# Patient Record
Sex: MALE | Race: BLACK OR AFRICAN AMERICAN | NOT HISPANIC OR LATINO | Employment: OTHER | RURAL
[De-identification: names, ages, dates, MRNs, and addresses within clinical notes are randomized per-mention and may not be internally consistent; named-entity substitution may affect disease eponyms.]

---

## 2018-02-13 ENCOUNTER — HISTORICAL (OUTPATIENT)
Dept: ADMINISTRATIVE | Facility: HOSPITAL | Age: 63
End: 2018-02-13

## 2018-02-19 LAB
LAB AP CLINICAL INFORMATION: NORMAL
LAB AP DIAGNOSIS - HISTORICAL: NORMAL
LAB AP GROSS PATHOLOGY - HISTORICAL: NORMAL
LAB AP SPECIMEN SUBMITTED - HISTORICAL: NORMAL

## 2018-03-01 ENCOUNTER — HISTORICAL (OUTPATIENT)
Dept: ADMINISTRATIVE | Facility: HOSPITAL | Age: 63
End: 2018-03-01

## 2018-03-07 ENCOUNTER — HISTORICAL (OUTPATIENT)
Dept: ADMINISTRATIVE | Facility: HOSPITAL | Age: 63
End: 2018-03-07

## 2020-04-13 ENCOUNTER — HISTORICAL (OUTPATIENT)
Dept: ADMINISTRATIVE | Facility: HOSPITAL | Age: 65
End: 2020-04-13

## 2020-04-13 LAB
ALBUMIN SERPL BCP-MCNC: 3.4 G/DL (ref 3.5–5)
ALBUMIN/GLOB SERPL: 0.7 {RATIO}
ALP SERPL-CCNC: 88 U/L (ref 45–115)
ALT SERPL W P-5'-P-CCNC: 18 U/L (ref 16–61)
APTT PPP: 31.5 SECONDS (ref 25.2–37.3)
AST SERPL W P-5'-P-CCNC: 19 U/L (ref 15–37)
BACTERIA #/AREA URNS HPF: ABNORMAL /HPF
BASOPHILS # BLD AUTO: 0.01 X10E3/UL (ref 0–0.2)
BASOPHILS NFR BLD AUTO: 0.2 % (ref 0–1)
BILIRUB SERPL-MCNC: 0.4 MG/DL (ref 0–1.2)
BILIRUB UR QL STRIP: NEGATIVE MG/DL
BUN SERPL-MCNC: 20 MG/DL (ref 7–18)
BUN/CREAT SERPL: 16.1
CALCIUM SERPL-MCNC: 9 MG/DL (ref 8.5–10.1)
CHLORIDE SERPL-SCNC: 104 MMOL/L (ref 98–107)
CK MB SERPL-MCNC: <1 NG/ML (ref 1–3.6)
CK SERPL-CCNC: 150 U/L (ref 39–308)
CLARITY UR: CLEAR
CLARITY UR: CLEAR
CO2 SERPL-SCNC: 29 MMOL/L (ref 21–32)
COLOR UR: ABNORMAL
COLOR UR: ABNORMAL
CREAT SERPL-MCNC: 1.24 MG/DL (ref 0.7–1.3)
EOSINOPHIL # BLD AUTO: 0.04 X10E3/UL (ref 0–0.5)
EOSINOPHIL NFR BLD AUTO: 0.7 % (ref 1–4)
ERYTHROCYTE [DISTWIDTH] IN BLOOD BY AUTOMATED COUNT: 12.2 % (ref 11.5–14.5)
GLOBULIN SER-MCNC: 4.7 G/DL (ref 2–4)
GLUCOSE SERPL-MCNC: 191 MG/DL (ref 74–106)
GLUCOSE UR STRIP-MCNC: 100 MG/DL
HCT VFR BLD AUTO: 39.5 % (ref 40–54)
HGB BLD-MCNC: 12.5 G/DL (ref 13.5–18)
IMM GRANULOCYTES # BLD AUTO: 0.02 X10E3/UL (ref 0–0.04)
IMM GRANULOCYTES NFR BLD: 0.4 % (ref 0–0.4)
INR BLD: 0.9 (ref 0–3.3)
KETONES UR STRIP-SCNC: ABNORMAL MG/DL
LEUKOCYTE ESTERASE UR QL STRIP: NEGATIVE LEU/UL
LYMPHOCYTES # BLD AUTO: 1.29 X10E3/UL (ref 1–4.8)
LYMPHOCYTES NFR BLD AUTO: 22.6 % (ref 27–41)
MAGNESIUM SERPL-MCNC: 2.1 MG/DL (ref 1.7–2.3)
MCH RBC QN AUTO: 28.5 PG (ref 27–31)
MCHC RBC AUTO-ENTMCNC: 31.6 G/DL (ref 32–36)
MCV RBC AUTO: 90.2 FL (ref 80–96)
MONOCYTES # BLD AUTO: 0.38 X10E3/UL (ref 0–0.8)
MONOCYTES NFR BLD AUTO: 6.7 % (ref 2–6)
MPC BLD CALC-MCNC: 11.2 FL (ref 9.4–12.4)
MYOGLOBIN SERPL-MCNC: 45 NG/ML (ref 16–116)
NEUTROPHILS # BLD AUTO: 3.97 X10E3/UL (ref 1.8–7.7)
NEUTROPHILS NFR BLD AUTO: 69.4 % (ref 53–65)
NITRITE UR QL STRIP: NEGATIVE
NRBC # BLD AUTO: 0 X10E3/UL (ref 0–0)
NRBC, AUTO (.00): 0 /100 (ref 0–0)
PH UR STRIP: 7 PH UNITS (ref 5–8)
PLATELET # BLD AUTO: 169 X10E3/UL (ref 150–400)
POTASSIUM SERPL-SCNC: 4 MMOL/L (ref 3.5–5.1)
PROT SERPL-MCNC: 8.1 G/DL (ref 6.4–8.2)
PROT UR QL STRIP: 100 MG/DL
PROTHROMBIN TIME: 12.3 SECONDS (ref 11.7–14.7)
RBC # BLD AUTO: 4.38 X10E6/UL (ref 4.6–6.2)
RBC # UR STRIP: ABNORMAL ERY/UL
RBC #/AREA URNS HPF: ABNORMAL /HPF (ref 0–3)
SODIUM SERPL-SCNC: 139 MMOL/L (ref 136–145)
SP GR UR STRIP: 1.02 (ref 1–1.03)
SQUAMOUS #/AREA URNS LPF: ABNORMAL /LPF
TROPONIN I SERPL-MCNC: <0.017 NG/ML (ref 0–0.06)
UROBILINOGEN UR STRIP-ACNC: 0.2 EU/DL
WBC # BLD AUTO: 5.71 X10E3/UL (ref 4.5–11)
WBC #/AREA URNS HPF: ABNORMAL /HPF (ref 0–5)

## 2020-04-14 ENCOUNTER — HISTORICAL (OUTPATIENT)
Dept: ADMINISTRATIVE | Facility: HOSPITAL | Age: 65
End: 2020-04-14

## 2020-04-14 LAB
BUN SERPL-MCNC: 16 MG/DL (ref 7–18)
CALCIUM SERPL-MCNC: 9 MG/DL (ref 8.5–10.1)
CHLORIDE SERPL-SCNC: 105 MMOL/L (ref 98–107)
CHOLEST SERPL-MCNC: 247 MG/DL
CHOLEST/HDLC SERPL: 5.4 {RATIO}
CO2 SERPL-SCNC: 25 MMOL/L (ref 21–32)
CREAT SERPL-MCNC: 1.04 MG/DL (ref 0.7–1.3)
CRP SERPL-MCNC: 0.3 UG/ML (ref 0–0.8)
ERYTHROCYTE [SEDIMENTATION RATE] IN BLOOD BY WESTERGREN METHOD: 60 MM/HR (ref 0–20)
EST. AVERAGE GLUCOSE BLD GHB EST-MCNC: 257 MG/DL
GLUCOSE SERPL-MCNC: 132 MG/DL (ref 70–105)
GLUCOSE SERPL-MCNC: 138 MG/DL (ref 74–106)
GLUCOSE SERPL-MCNC: 187 MG/DL (ref 70–105)
GLUCOSE SERPL-MCNC: 193 MG/DL (ref 70–105)
GLUCOSE SERPL-MCNC: 205 MG/DL (ref 70–105)
GLUCOSE SERPL-MCNC: 249 MG/DL (ref 70–105)
HBA1C MFR BLD HPLC: 10.3 %
HDLC SERPL-MCNC: 46 MG/DL
LDLC SERPL CALC-MCNC: 183 MG/DL
POTASSIUM SERPL-SCNC: 3.7 MMOL/L (ref 3.5–5.1)
SODIUM SERPL-SCNC: 139 MMOL/L (ref 136–145)
T4 SERPL-MCNC: 8.9 UG/DL (ref 4.5–12.1)
TRIGL SERPL-MCNC: 91 MG/DL
TSH SERPL DL<=0.005 MIU/L-ACNC: 3.12 UIU/ML (ref 0.36–3.74)

## 2020-04-15 ENCOUNTER — HISTORICAL (OUTPATIENT)
Dept: ADMINISTRATIVE | Facility: HOSPITAL | Age: 65
End: 2020-04-15

## 2020-04-15 LAB
BASOPHILS # BLD AUTO: 0.01 X10E3/UL (ref 0–0.2)
BASOPHILS NFR BLD AUTO: 0.2 % (ref 0–1)
BUN SERPL-MCNC: 17 MG/DL (ref 7–18)
CALCIUM SERPL-MCNC: 9.2 MG/DL (ref 8.5–10.1)
CHLORIDE SERPL-SCNC: 103 MMOL/L (ref 98–107)
CO2 SERPL-SCNC: 29 MMOL/L (ref 21–32)
CREAT SERPL-MCNC: 1.19 MG/DL (ref 0.7–1.3)
EOSINOPHIL # BLD AUTO: 0.03 X10E3/UL (ref 0–0.5)
EOSINOPHIL NFR BLD AUTO: 0.5 % (ref 1–4)
ERYTHROCYTE [DISTWIDTH] IN BLOOD BY AUTOMATED COUNT: 12.2 % (ref 11.5–14.5)
GLUCOSE SERPL-MCNC: 141 MG/DL (ref 70–105)
GLUCOSE SERPL-MCNC: 144 MG/DL (ref 70–105)
GLUCOSE SERPL-MCNC: 154 MG/DL (ref 74–106)
GLUCOSE SERPL-MCNC: 162 MG/DL (ref 70–105)
GLUCOSE SERPL-MCNC: 257 MG/DL (ref 70–105)
HCT VFR BLD AUTO: 42.1 % (ref 40–54)
HGB BLD-MCNC: 13.8 G/DL (ref 13.5–18)
IMM GRANULOCYTES # BLD AUTO: 0.02 X10E3/UL (ref 0–0.04)
IMM GRANULOCYTES NFR BLD: 0.3 % (ref 0–0.4)
LYMPHOCYTES # BLD AUTO: 1.25 X10E3/UL (ref 1–4.8)
LYMPHOCYTES NFR BLD AUTO: 18.8 % (ref 27–41)
MAGNESIUM SERPL-MCNC: 1.9 MG/DL (ref 1.7–2.3)
MCH RBC QN AUTO: 29.3 PG (ref 27–31)
MCHC RBC AUTO-ENTMCNC: 32.8 G/DL (ref 32–36)
MCV RBC AUTO: 89.4 FL (ref 80–96)
MONOCYTES # BLD AUTO: 0.48 X10E3/UL (ref 0–0.8)
MONOCYTES NFR BLD AUTO: 7.2 % (ref 2–6)
MPC BLD CALC-MCNC: 10.5 FL (ref 9.4–12.4)
NEUTROPHILS # BLD AUTO: 4.86 X10E3/UL (ref 1.8–7.7)
NEUTROPHILS NFR BLD AUTO: 73 % (ref 53–65)
NRBC # BLD AUTO: 0 X10E3/UL (ref 0–0)
NRBC, AUTO (.00): 0 /100 (ref 0–0)
PLATELET # BLD AUTO: 174 X10E3/UL (ref 150–400)
POTASSIUM SERPL-SCNC: 4 MMOL/L (ref 3.5–5.1)
RBC # BLD AUTO: 4.71 X10E6/UL (ref 4.6–6.2)
SODIUM SERPL-SCNC: 139 MMOL/L (ref 136–145)
WBC # BLD AUTO: 6.65 X10E3/UL (ref 4.5–11)

## 2020-04-16 ENCOUNTER — HISTORICAL (OUTPATIENT)
Dept: ADMINISTRATIVE | Facility: HOSPITAL | Age: 65
End: 2020-04-16

## 2020-04-16 LAB
GLUCOSE SERPL-MCNC: 132 MG/DL (ref 70–105)
GLUCOSE SERPL-MCNC: 230 MG/DL (ref 70–105)

## 2020-06-09 ENCOUNTER — HISTORICAL (OUTPATIENT)
Dept: ADMINISTRATIVE | Facility: HOSPITAL | Age: 65
End: 2020-06-09

## 2020-06-09 LAB
APTT PPP: 38.4 SECONDS (ref 25.2–37.3)
BACTERIA #/AREA URNS HPF: ABNORMAL /HPF
BASOPHILS # BLD AUTO: 0.02 X10E3/UL (ref 0–0.2)
BASOPHILS NFR BLD AUTO: 0.2 % (ref 0–1)
BILIRUB UR QL STRIP: ABNORMAL MG/DL
BUN SERPL-MCNC: 84 MG/DL (ref 7–18)
CALCIUM SERPL-MCNC: 8.5 MG/DL (ref 8.5–10.1)
CHLORIDE SERPL-SCNC: 109 MMOL/L (ref 98–107)
CK MB SERPL-MCNC: 1.4 NG/ML (ref 1–3.6)
CK SERPL-CCNC: 1671 U/L (ref 39–308)
CLARITY UR: ABNORMAL
CLARITY UR: ABNORMAL
CO2 SERPL-SCNC: 26 MMOL/L (ref 21–32)
COLOR UR: YELLOW
COLOR UR: YELLOW
CREAT SERPL-MCNC: 2.76 MG/DL (ref 0.7–1.3)
CRYSTALS FLD MICRO: ABNORMAL
DOHLE BOD BLD QL SMEAR: ABNORMAL
EOSINOPHIL # BLD AUTO: 0 X10E3/UL (ref 0–0.5)
EOSINOPHIL NFR BLD AUTO: 0 % (ref 1–4)
ERYTHROCYTE [DISTWIDTH] IN BLOOD BY AUTOMATED COUNT: 12.9 % (ref 11.5–14.5)
GLUCOSE SERPL-MCNC: 373 MG/DL (ref 70–105)
GLUCOSE SERPL-MCNC: 388 MG/DL (ref 74–106)
GLUCOSE UR STRIP-MCNC: 100 MG/DL
GRAN CASTS #/AREA URNS LPF: ABNORMAL /LPF
HCT VFR BLD AUTO: 40.1 % (ref 40–54)
HGB BLD-MCNC: 12.2 G/DL (ref 13.5–18)
HYALINE CASTS #/AREA URNS LPF: ABNORMAL /LPF (ref 0–2)
IMM GRANULOCYTES # BLD AUTO: 0.07 X10E3/UL (ref 0–0.04)
IMM GRANULOCYTES NFR BLD: 0.6 % (ref 0–0.4)
INR BLD: 1.18 (ref 0–3.3)
KETONES UR STRIP-SCNC: NEGATIVE MG/DL
LEUKOCYTE ESTERASE UR QL STRIP: NEGATIVE LEU/UL
LYMPHOCYTES # BLD AUTO: 1.44 X10E3/UL (ref 1–4.8)
LYMPHOCYTES NFR BLD AUTO: 12.5 % (ref 27–41)
LYMPHOCYTES NFR BLD MANUAL: 10 % (ref 27–41)
MAGNESIUM SERPL-MCNC: 3.2 MG/DL (ref 1.7–2.3)
MCH RBC QN AUTO: 28 PG (ref 27–31)
MCHC RBC AUTO-ENTMCNC: 30.4 G/DL (ref 32–36)
MCV RBC AUTO: 92 FL (ref 80–96)
MONOCYTES # BLD AUTO: 0.56 X10E3/UL (ref 0–0.8)
MONOCYTES NFR BLD AUTO: 4.9 % (ref 2–6)
MONOCYTES NFR BLD MANUAL: 3 % (ref 2–6)
MPC BLD CALC-MCNC: 11.6 FL (ref 9.4–12.4)
MYOGLOBIN SERPL-MCNC: 914 NG/ML (ref 16–116)
NEUTROPHILS # BLD AUTO: 9.44 X10E3/UL (ref 1.8–7.7)
NEUTROPHILS NFR BLD AUTO: 81.8 % (ref 53–65)
NEUTS SEG NFR BLD MANUAL: 87 % (ref 50–62)
NITRITE UR QL STRIP: NEGATIVE
NRBC # BLD AUTO: 0 X10E3/UL (ref 0–0)
NRBC BLD MANUAL-RTO: 2 /100 (ref 0–0)
NRBC, AUTO (.00): 0 /100 (ref 0–0)
NT-PROBNP SERPL-MCNC: 80 PG/ML (ref 1–125)
PH UR STRIP: 5.5 PH UNITS (ref 5–8)
PLATELET # BLD AUTO: 194 X10E3/UL (ref 150–400)
PLATELET MORPHOLOGY: ABNORMAL
POTASSIUM SERPL-SCNC: 4.6 MMOL/L (ref 3.5–5.1)
PROT UR QL STRIP: >=300 MG/DL
PROTHROMBIN TIME: 15.1 SECONDS (ref 11.7–14.7)
RBC # BLD AUTO: 4.36 X10E6/UL (ref 4.6–6.2)
RBC # UR STRIP: ABNORMAL ERY/UL
RBC #/AREA URNS HPF: ABNORMAL /HPF (ref 0–3)
RBC MORPH BLD: NORMAL
SODIUM SERPL-SCNC: 143 MMOL/L (ref 136–145)
SP GR UR STRIP: >=1.03 (ref 1–1.03)
SQUAMOUS #/AREA URNS LPF: ABNORMAL /LPF
TOXIC GRANULES BLD QL SMEAR: ABNORMAL
TROPONIN I SERPL-MCNC: 0.02 NG/ML (ref 0–0.06)
UROBILINOGEN UR STRIP-ACNC: 1 EU/DL
WBC # BLD AUTO: 11.53 X10E3/UL (ref 4.5–11)
WBC #/AREA URNS HPF: ABNORMAL /HPF (ref 0–5)

## 2020-06-10 ENCOUNTER — HISTORICAL (OUTPATIENT)
Dept: ADMINISTRATIVE | Facility: HOSPITAL | Age: 65
End: 2020-06-10

## 2020-06-10 LAB
BUN SERPL-MCNC: 86 MG/DL (ref 7–18)
CALCIUM SERPL-MCNC: 7.9 MG/DL (ref 8.5–10.1)
CHLORIDE SERPL-SCNC: 109 MMOL/L (ref 98–107)
CO2 SERPL-SCNC: 26 MMOL/L (ref 21–32)
CREAT SERPL-MCNC: 2.55 MG/DL (ref 0.7–1.3)
D DIMER PPP FEU-MCNC: 8.25 UG/ML (ref 0–0.47)
GLUCOSE SERPL-MCNC: 138 MG/DL (ref 70–105)
GLUCOSE SERPL-MCNC: 166 MG/DL (ref 70–105)
GLUCOSE SERPL-MCNC: 181 MG/DL (ref 70–105)
GLUCOSE SERPL-MCNC: 310 MG/DL (ref 74–106)
GLUCOSE SERPL-MCNC: 319 MG/DL (ref 70–105)
POTASSIUM SERPL-SCNC: 4.2 MMOL/L (ref 3.5–5.1)
SODIUM SERPL-SCNC: 145 MMOL/L (ref 136–145)

## 2020-06-11 ENCOUNTER — HISTORICAL (OUTPATIENT)
Dept: ADMINISTRATIVE | Facility: HOSPITAL | Age: 65
End: 2020-06-11

## 2020-06-11 LAB
BUN SERPL-MCNC: 72 MG/DL (ref 7–18)
CALCIUM SERPL-MCNC: 8.3 MG/DL (ref 8.5–10.1)
CHLORIDE SERPL-SCNC: 116 MMOL/L (ref 98–107)
CO2 SERPL-SCNC: 26 MMOL/L (ref 21–32)
CREAT SERPL-MCNC: 1.72 MG/DL (ref 0.7–1.3)
GLUCOSE SERPL-MCNC: 135 MG/DL (ref 70–105)
GLUCOSE SERPL-MCNC: 161 MG/DL (ref 74–106)
GLUCOSE SERPL-MCNC: 207 MG/DL (ref 70–105)
GLUCOSE SERPL-MCNC: 277 MG/DL (ref 70–105)
GLUCOSE SERPL-MCNC: 93 MG/DL (ref 70–105)
POTASSIUM SERPL-SCNC: 3.8 MMOL/L (ref 3.5–5.1)
SODIUM SERPL-SCNC: 151 MMOL/L (ref 136–145)

## 2020-06-12 ENCOUNTER — HISTORICAL (OUTPATIENT)
Dept: ADMINISTRATIVE | Facility: HOSPITAL | Age: 65
End: 2020-06-12

## 2020-06-12 LAB
BUN SERPL-MCNC: 55 MG/DL (ref 7–18)
CALCIUM SERPL-MCNC: 8.9 MG/DL (ref 8.5–10.1)
CHLORIDE SERPL-SCNC: 114 MMOL/L (ref 98–107)
CO2 SERPL-SCNC: 28 MMOL/L (ref 21–32)
CREAT SERPL-MCNC: 1.47 MG/DL (ref 0.7–1.3)
GLUCOSE SERPL-MCNC: 103 MG/DL (ref 70–105)
GLUCOSE SERPL-MCNC: 104 MG/DL (ref 74–106)
GLUCOSE SERPL-MCNC: 199 MG/DL (ref 70–105)
GLUCOSE SERPL-MCNC: 91 MG/DL (ref 70–105)
POTASSIUM SERPL-SCNC: 3.8 MMOL/L (ref 3.5–5.1)
SODIUM SERPL-SCNC: 151 MMOL/L (ref 136–145)

## 2020-06-13 ENCOUNTER — HISTORICAL (OUTPATIENT)
Dept: ADMINISTRATIVE | Facility: HOSPITAL | Age: 65
End: 2020-06-13

## 2020-06-13 LAB
GLUCOSE SERPL-MCNC: 100 MG/DL (ref 70–105)
GLUCOSE SERPL-MCNC: 137 MG/DL (ref 70–105)
GLUCOSE SERPL-MCNC: 187 MG/DL (ref 70–105)
GLUCOSE SERPL-MCNC: 82 MG/DL (ref 70–105)

## 2020-06-14 ENCOUNTER — HISTORICAL (OUTPATIENT)
Dept: ADMINISTRATIVE | Facility: HOSPITAL | Age: 65
End: 2020-06-14

## 2020-06-14 LAB
BASOPHILS # BLD AUTO: 0.03 X10E3/UL (ref 0–0.2)
BASOPHILS NFR BLD AUTO: 0.3 % (ref 0–1)
BUN SERPL-MCNC: 46 MG/DL (ref 7–18)
CALCIUM SERPL-MCNC: 8.6 MG/DL (ref 8.5–10.1)
CHLORIDE SERPL-SCNC: 115 MMOL/L (ref 98–107)
CO2 SERPL-SCNC: 30 MMOL/L (ref 21–32)
CREAT SERPL-MCNC: 1.44 MG/DL (ref 0.7–1.3)
CRP SERPL-MCNC: 5.2 UG/ML (ref 0–0.8)
D DIMER PPP FEU-MCNC: 1.76 UG/ML (ref 0–0.47)
EOSINOPHIL # BLD AUTO: 0.07 X10E3/UL (ref 0–0.5)
EOSINOPHIL NFR BLD AUTO: 0.6 % (ref 1–4)
ERYTHROCYTE [DISTWIDTH] IN BLOOD BY AUTOMATED COUNT: 13.4 % (ref 11.5–14.5)
GLUCOSE SERPL-MCNC: 108 MG/DL (ref 70–105)
GLUCOSE SERPL-MCNC: 85 MG/DL (ref 70–105)
GLUCOSE SERPL-MCNC: 88 MG/DL (ref 74–106)
HCT VFR BLD AUTO: 39.4 % (ref 40–54)
HGB BLD-MCNC: 11.8 G/DL (ref 13.5–18)
IMM GRANULOCYTES # BLD AUTO: 0.11 X10E3/UL (ref 0–0.04)
IMM GRANULOCYTES NFR BLD: 0.9 % (ref 0–0.4)
LYMPHOCYTES # BLD AUTO: 1.14 X10E3/UL (ref 1–4.8)
LYMPHOCYTES NFR BLD AUTO: 9.7 % (ref 27–41)
MCH RBC QN AUTO: 28.1 PG (ref 27–31)
MCHC RBC AUTO-ENTMCNC: 29.9 G/DL (ref 32–36)
MCV RBC AUTO: 93.8 FL (ref 80–96)
MONOCYTES # BLD AUTO: 0.71 X10E3/UL (ref 0–0.8)
MONOCYTES NFR BLD AUTO: 6.1 % (ref 2–6)
MPC BLD CALC-MCNC: 11.6 FL (ref 9.4–12.4)
NEUTROPHILS # BLD AUTO: 9.64 X10E3/UL (ref 1.8–7.7)
NEUTROPHILS NFR BLD AUTO: 82.4 % (ref 53–65)
NRBC # BLD AUTO: 0 X10E3/UL (ref 0–0)
NRBC, AUTO (.00): 0 /100 (ref 0–0)
PLATELET # BLD AUTO: 235 X10E3/UL (ref 150–400)
POTASSIUM SERPL-SCNC: 3.7 MMOL/L (ref 3.5–5.1)
RBC # BLD AUTO: 4.2 X10E6/UL (ref 4.6–6.2)
SODIUM SERPL-SCNC: 152 MMOL/L (ref 136–145)
WBC # BLD AUTO: 11.7 X10E3/UL (ref 4.5–11)

## 2020-06-23 ENCOUNTER — HISTORICAL (OUTPATIENT)
Dept: ADMINISTRATIVE | Facility: HOSPITAL | Age: 65
End: 2020-06-23

## 2020-06-23 LAB
BASOPHILS # BLD AUTO: 0.04 X10E3/UL (ref 0–0.2)
BASOPHILS NFR BLD AUTO: 0.4 % (ref 0–1)
BUN SERPL-MCNC: 53 MG/DL (ref 7–18)
CALCIUM SERPL-MCNC: 9.2 MG/DL (ref 8.5–10.1)
CHLORIDE SERPL-SCNC: 124 MMOL/L (ref 98–107)
CO2 SERPL-SCNC: 27 MMOL/L (ref 21–32)
CREAT SERPL-MCNC: 1.34 MG/DL (ref 0.7–1.3)
EOSINOPHIL # BLD AUTO: 0.02 X10E3/UL (ref 0–0.5)
EOSINOPHIL NFR BLD AUTO: 0.2 % (ref 1–4)
ERYTHROCYTE [DISTWIDTH] IN BLOOD BY AUTOMATED COUNT: 13.5 % (ref 11.5–14.5)
GLUCOSE SERPL-MCNC: 262 MG/DL (ref 74–106)
HCT VFR BLD AUTO: 42.5 % (ref 40–54)
HGB BLD-MCNC: 12.6 G/DL (ref 13.5–18)
IMM GRANULOCYTES # BLD AUTO: 0.02 X10E3/UL (ref 0–0.04)
IMM GRANULOCYTES NFR BLD: 0.2 % (ref 0–0.4)
LYMPHOCYTES # BLD AUTO: 1.32 X10E3/UL (ref 1–4.8)
LYMPHOCYTES NFR BLD AUTO: 13.6 % (ref 27–41)
MCH RBC QN AUTO: 28 PG (ref 27–31)
MCHC RBC AUTO-ENTMCNC: 29.6 G/DL (ref 32–36)
MCV RBC AUTO: 94.4 FL (ref 80–96)
MONOCYTES # BLD AUTO: 0.49 X10E3/UL (ref 0–0.8)
MONOCYTES NFR BLD AUTO: 5 % (ref 2–6)
MPC BLD CALC-MCNC: 12.5 FL (ref 9.4–12.4)
NEUTROPHILS # BLD AUTO: 7.83 X10E3/UL (ref 1.8–7.7)
NEUTROPHILS NFR BLD AUTO: 80.6 % (ref 53–65)
NRBC # BLD AUTO: 0 X10E3/UL (ref 0–0)
NRBC, AUTO (.00): 0 /100 (ref 0–0)
PLATELET # BLD AUTO: 191 X10E3/UL (ref 150–400)
POTASSIUM SERPL-SCNC: 4.8 MMOL/L (ref 3.5–5.1)
RBC # BLD AUTO: 4.5 X10E6/UL (ref 4.6–6.2)
SODIUM SERPL-SCNC: 158 MMOL/L (ref 136–145)
WBC # BLD AUTO: 9.72 X10E3/UL (ref 4.5–11)

## 2020-06-24 ENCOUNTER — HISTORICAL (OUTPATIENT)
Dept: ADMINISTRATIVE | Facility: HOSPITAL | Age: 65
End: 2020-06-24

## 2020-06-24 LAB
ALBUMIN SERPL BCP-MCNC: 2.3 G/DL (ref 3.5–5)
ALBUMIN/GLOB SERPL: 0.4 {RATIO}
ALP SERPL-CCNC: 89 U/L (ref 45–115)
ALT SERPL W P-5'-P-CCNC: 19 U/L (ref 16–61)
AST SERPL W P-5'-P-CCNC: 15 U/L (ref 15–37)
BACTERIA #/AREA URNS HPF: ABNORMAL /HPF
BASOPHILS # BLD AUTO: 0.04 X10E3/UL (ref 0–0.2)
BASOPHILS NFR BLD AUTO: 0.4 % (ref 0–1)
BILIRUB SERPL-MCNC: 0.4 MG/DL (ref 0–1.2)
BILIRUB UR QL STRIP: ABNORMAL MG/DL
BUN SERPL-MCNC: 57 MG/DL (ref 7–18)
BUN/CREAT SERPL: 39
CALCIUM SERPL-MCNC: 8.9 MG/DL (ref 8.5–10.1)
CHLORIDE SERPL-SCNC: 122 MMOL/L (ref 98–107)
CLARITY UR: CLEAR
CLARITY UR: CLEAR
CO2 SERPL-SCNC: 29 MMOL/L (ref 21–32)
COLOR UR: YELLOW
COLOR UR: YELLOW
CREAT SERPL-MCNC: 1.46 MG/DL (ref 0.7–1.3)
EOSINOPHIL # BLD AUTO: 0.03 X10E3/UL (ref 0–0.5)
EOSINOPHIL NFR BLD AUTO: 0.3 % (ref 1–4)
ERYTHROCYTE [DISTWIDTH] IN BLOOD BY AUTOMATED COUNT: 13.2 % (ref 11.5–14.5)
GLOBULIN SER-MCNC: 5.9 G/DL (ref 2–4)
GLUCOSE SERPL-MCNC: 223 MG/DL (ref 60–95)
GLUCOSE SERPL-MCNC: 261 MG/DL (ref 74–106)
GLUCOSE UR STRIP-MCNC: NEGATIVE MG/DL
GRAN CASTS #/AREA URNS LPF: ABNORMAL /LPF
HCO3 UR-SCNC: 29 MMOL/L (ref 21–28)
HCT VFR BLD AUTO: 39.2 % (ref 40–54)
HGB BLD-MCNC: 11.5 G/DL (ref 13.5–18)
HYALINE CASTS #/AREA URNS LPF: ABNORMAL /LPF (ref 0–2)
IMM GRANULOCYTES # BLD AUTO: 0.03 X10E3/UL (ref 0–0.04)
IMM GRANULOCYTES NFR BLD: 0.3 % (ref 0–0.4)
KETONES UR STRIP-SCNC: NEGATIVE MG/DL
LDH SERPL L TO P-CCNC: 1 MMOL/L (ref 0.3–1.2)
LEUKOCYTE ESTERASE UR QL STRIP: NEGATIVE LEU/UL
LYMPHOCYTES # BLD AUTO: 1.2 X10E3/UL (ref 1–4.8)
LYMPHOCYTES NFR BLD AUTO: 13.3 % (ref 27–41)
MCH RBC QN AUTO: 28.4 PG (ref 27–31)
MCHC RBC AUTO-ENTMCNC: 29.3 G/DL (ref 32–36)
MCV RBC AUTO: 96.8 FL (ref 80–96)
MONOCYTES # BLD AUTO: 0.42 X10E3/UL (ref 0–0.8)
MONOCYTES NFR BLD AUTO: 4.6 % (ref 2–6)
MPC BLD CALC-MCNC: 11.9 FL (ref 9.4–12.4)
MUCOUS THREADS #/AREA URNS HPF: ABNORMAL /HPF
NEUTROPHILS # BLD AUTO: 7.32 X10E3/UL (ref 1.8–7.7)
NEUTROPHILS NFR BLD AUTO: 81.1 % (ref 53–65)
NITRITE UR QL STRIP: NEGATIVE
NRBC # BLD AUTO: 0 X10E3/UL (ref 0–0)
NRBC, AUTO (.00): 0 /100 (ref 0–0)
OSMOLALITY SERPL: 349 MOSM/KG (ref 280–301)
PCO2 BLDA: 48 MM HG (ref 35–48)
PH SMN: 7.39 PH UNITS (ref 7.35–7.45)
PH UR STRIP: 5.5 PH UNITS (ref 5–8)
PLATELET # BLD AUTO: 160 X10E3/UL (ref 150–400)
PO2 BLDA: 77 MM HG (ref 83–108)
POC BASE EXCESS ARTERIAL: 3.4 MMOL/L (ref -2–3)
POC HCT: 36 % (ref 35–51)
POC IONIZED CALCIUM: 1.22 MMOL/L (ref 1.15–1.35)
POC SATURATED O2: 95 % (ref 95–98)
POTASSIUM SERPL-SCNC: 4 MMOL/L (ref 3.5–5.1)
POTASSIUM SERPL-SCNC: 4.2 MMOL/L (ref 3.4–4.5)
PROT SERPL-MCNC: 8.2 G/DL (ref 6.4–8.2)
PROT UR QL STRIP: 100 MG/DL
RBC # BLD AUTO: 4.05 X10E6/UL (ref 4.6–6.2)
RBC # UR STRIP: ABNORMAL ERY/UL
RBC #/AREA URNS HPF: ABNORMAL /HPF (ref 0–3)
SARS-COV-2 RNA AMPLIFICATION, QUAL: POSITIVE
SODIUM SERPL-SCNC: 158 MMOL/L (ref 136–145)
SODIUM SERPL-SCNC: 160 MMOL/L (ref 136–145)
SP GR UR STRIP: >=1.03 (ref 1–1.03)
SQUAMOUS #/AREA URNS LPF: ABNORMAL /LPF
TRICHOMONAS #/AREA URNS HPF: ABNORMAL /HPF
UROBILINOGEN UR STRIP-ACNC: 1 EU/DL
WBC # BLD AUTO: 9.04 X10E3/UL (ref 4.5–11)
WBC #/AREA URNS HPF: ABNORMAL /HPF (ref 0–5)
YEAST #/AREA URNS HPF: ABNORMAL /HPF

## 2020-06-25 ENCOUNTER — HISTORICAL (OUTPATIENT)
Dept: ADMINISTRATIVE | Facility: HOSPITAL | Age: 65
End: 2020-06-25

## 2020-06-25 LAB
GLUCOSE SERPL-MCNC: 160 MG/DL (ref 70–105)
GLUCOSE SERPL-MCNC: 177 MG/DL (ref 70–105)
GLUCOSE SERPL-MCNC: 211 MG/DL (ref 70–105)
GLUCOSE SERPL-MCNC: 320 MG/DL (ref 70–105)

## 2020-06-26 ENCOUNTER — HISTORICAL (OUTPATIENT)
Dept: ADMINISTRATIVE | Facility: HOSPITAL | Age: 65
End: 2020-06-26

## 2020-06-26 LAB
GLUCOSE SERPL-MCNC: 106 MG/DL (ref 70–105)
GLUCOSE SERPL-MCNC: 158 MG/DL (ref 70–105)
GLUCOSE SERPL-MCNC: 178 MG/DL (ref 70–105)
GLUCOSE SERPL-MCNC: 88 MG/DL (ref 70–105)
REPORT: NORMAL

## 2020-06-27 ENCOUNTER — HISTORICAL (OUTPATIENT)
Dept: ADMINISTRATIVE | Facility: HOSPITAL | Age: 65
End: 2020-06-27

## 2020-06-27 LAB
ALBUMIN SERPL BCP-MCNC: 1.9 G/DL (ref 3.5–5)
ALBUMIN/GLOB SERPL: 0.4 {RATIO}
ALP SERPL-CCNC: 70 U/L (ref 45–115)
ALT SERPL W P-5'-P-CCNC: 16 U/L (ref 16–61)
AST SERPL W P-5'-P-CCNC: 18 U/L (ref 15–37)
BASOPHILS # BLD AUTO: 0.02 X10E3/UL (ref 0–0.2)
BASOPHILS NFR BLD AUTO: 0.3 % (ref 0–1)
BILIRUB SERPL-MCNC: 0.3 MG/DL (ref 0–1.2)
BUN SERPL-MCNC: 21 MG/DL (ref 7–18)
BUN/CREAT SERPL: 19.8
CALCIUM SERPL-MCNC: 8.5 MG/DL (ref 8.5–10.1)
CHLORIDE SERPL-SCNC: 117 MMOL/L (ref 98–107)
CO2 SERPL-SCNC: 32 MMOL/L (ref 21–32)
CREAT SERPL-MCNC: 1.06 MG/DL (ref 0.7–1.3)
EOSINOPHIL # BLD AUTO: 0.03 X10E3/UL (ref 0–0.5)
EOSINOPHIL NFR BLD AUTO: 0.4 % (ref 1–4)
ERYTHROCYTE [DISTWIDTH] IN BLOOD BY AUTOMATED COUNT: 13.2 % (ref 11.5–14.5)
GLOBULIN SER-MCNC: 4.8 G/DL (ref 2–4)
GLUCOSE SERPL-MCNC: 127 MG/DL (ref 70–105)
GLUCOSE SERPL-MCNC: 135 MG/DL (ref 70–105)
GLUCOSE SERPL-MCNC: 158 MG/DL (ref 70–105)
GLUCOSE SERPL-MCNC: 172 MG/DL (ref 70–105)
GLUCOSE SERPL-MCNC: 48 MG/DL (ref 74–106)
HCT VFR BLD AUTO: 31.8 % (ref 40–54)
HGB BLD-MCNC: 9.6 G/DL (ref 13.5–18)
IMM GRANULOCYTES # BLD AUTO: 0.03 X10E3/UL (ref 0–0.04)
IMM GRANULOCYTES NFR BLD: 0.4 % (ref 0–0.4)
LYMPHOCYTES # BLD AUTO: 1.3 X10E3/UL (ref 1–4.8)
LYMPHOCYTES NFR BLD AUTO: 17 % (ref 27–41)
MCH RBC QN AUTO: 28 PG (ref 27–31)
MCHC RBC AUTO-ENTMCNC: 30.2 G/DL (ref 32–36)
MCV RBC AUTO: 92.7 FL (ref 80–96)
MONOCYTES # BLD AUTO: 0.37 X10E3/UL (ref 0–0.8)
MONOCYTES NFR BLD AUTO: 4.8 % (ref 2–6)
MPC BLD CALC-MCNC: 12.5 FL (ref 9.4–12.4)
NEUTROPHILS # BLD AUTO: 5.88 X10E3/UL (ref 1.8–7.7)
NEUTROPHILS NFR BLD AUTO: 77.1 % (ref 53–65)
NRBC # BLD AUTO: 0 X10E3/UL (ref 0–0)
NRBC, AUTO (.00): 0 /100 (ref 0–0)
PLATELET # BLD AUTO: 107 X10E3/UL (ref 150–400)
POTASSIUM SERPL-SCNC: 3 MMOL/L (ref 3.5–5.1)
PROT SERPL-MCNC: 6.7 G/DL (ref 6.4–8.2)
RBC # BLD AUTO: 3.43 X10E6/UL (ref 4.6–6.2)
SODIUM SERPL-SCNC: 153 MMOL/L (ref 136–145)
WBC # BLD AUTO: 7.63 X10E3/UL (ref 4.5–11)

## 2020-06-28 ENCOUNTER — HISTORICAL (OUTPATIENT)
Dept: ADMINISTRATIVE | Facility: HOSPITAL | Age: 65
End: 2020-06-28

## 2020-06-28 LAB
GLUCOSE SERPL-MCNC: 238 MG/DL (ref 70–105)
GLUCOSE SERPL-MCNC: 79 MG/DL (ref 70–105)

## 2020-06-30 LAB — GLUCOSE SERPL-MCNC: 49 MG/DL (ref 70–105)

## 2021-12-14 ENCOUNTER — HOSPITAL ENCOUNTER (EMERGENCY)
Facility: HOSPITAL | Age: 66
Discharge: HOME | End: 2021-12-14
Attending: EMERGENCY MEDICINE
Payer: MEDICARE

## 2021-12-14 VITALS
HEART RATE: 95 BPM | HEIGHT: 70 IN | TEMPERATURE: 99 F | WEIGHT: 190 LBS | SYSTOLIC BLOOD PRESSURE: 126 MMHG | BODY MASS INDEX: 27.2 KG/M2 | DIASTOLIC BLOOD PRESSURE: 85 MMHG | OXYGEN SATURATION: 98 % | RESPIRATION RATE: 11 BRPM

## 2021-12-14 DIAGNOSIS — R41.82 ALTERED MENTAL STATUS, UNSPECIFIED ALTERED MENTAL STATUS TYPE: Primary | ICD-10-CM

## 2021-12-14 LAB
ANION GAP SERPL CALCULATED.3IONS-SCNC: 7 MMOL/L (ref 7–16)
BASOPHILS # BLD AUTO: 0.03 K/UL (ref 0–0.2)
BASOPHILS NFR BLD AUTO: 0.2 % (ref 0–1)
BUN SERPL-MCNC: 18 MG/DL (ref 7–18)
BUN/CREAT SERPL: 30 (ref 6–20)
CALCIUM SERPL-MCNC: 8.4 MG/DL (ref 8.5–10.1)
CHLORIDE SERPL-SCNC: 104 MMOL/L (ref 98–107)
CO2 SERPL-SCNC: 35 MMOL/L (ref 21–32)
CREAT SERPL-MCNC: 0.6 MG/DL (ref 0.7–1.3)
DIFFERENTIAL METHOD BLD: ABNORMAL
EOSINOPHIL # BLD AUTO: 0.02 K/UL (ref 0–0.5)
EOSINOPHIL NFR BLD AUTO: 0.1 % (ref 1–4)
ERYTHROCYTE [DISTWIDTH] IN BLOOD BY AUTOMATED COUNT: 13.3 % (ref 11.5–14.5)
GLUCOSE SERPL-MCNC: 181 MG/DL (ref 74–106)
HCT VFR BLD AUTO: 31.3 % (ref 40–54)
HGB BLD-MCNC: 10.2 G/DL (ref 13.5–18)
HYPOCHROMIA BLD QL SMEAR: ABNORMAL
IMM GRANULOCYTES # BLD AUTO: 0.86 K/UL (ref 0–0.04)
IMM GRANULOCYTES NFR BLD: 5.8 % (ref 0–0.4)
LYMPHOCYTES # BLD AUTO: 1.33 K/UL (ref 1–4.8)
LYMPHOCYTES NFR BLD AUTO: 9 % (ref 27–41)
LYMPHOCYTES NFR BLD MANUAL: 10 % (ref 27–41)
MAGNESIUM SERPL-MCNC: 2.3 MG/DL (ref 1.7–2.3)
MCH RBC QN AUTO: 29.7 PG (ref 27–31)
MCHC RBC AUTO-ENTMCNC: 32.6 G/DL (ref 32–36)
MCV RBC AUTO: 91 FL (ref 80–96)
METAMYELOCYTES NFR BLD MANUAL: 2 %
MONOCYTES # BLD AUTO: 0.49 K/UL (ref 0–0.8)
MONOCYTES NFR BLD AUTO: 3.3 % (ref 2–6)
MONOCYTES NFR BLD MANUAL: 2 % (ref 2–6)
MPC BLD CALC-MCNC: 10.9 FL (ref 9.4–12.4)
NEUTROPHILS # BLD AUTO: 12.06 K/UL (ref 1.8–7.7)
NEUTROPHILS NFR BLD AUTO: 81.6 % (ref 53–65)
NEUTS BAND NFR BLD MANUAL: 4 % (ref 1–5)
NEUTS SEG NFR BLD MANUAL: 82 % (ref 50–62)
NRBC # BLD AUTO: 0.04 X10E3/UL
NRBC, AUTO (.00): 0.3 %
PLATELET # BLD AUTO: 248 K/UL (ref 150–400)
PLATELET MORPHOLOGY: ABNORMAL
POLYCHROMASIA BLD QL SMEAR: ABNORMAL
POTASSIUM SERPL-SCNC: 4.2 MMOL/L (ref 3.5–5.1)
RBC # BLD AUTO: 3.44 M/UL (ref 4.6–6.2)
SODIUM SERPL-SCNC: 142 MMOL/L (ref 136–145)
TARGETS BLD QL SMEAR: ABNORMAL
WBC # BLD AUTO: 14.79 K/UL (ref 4.5–11)

## 2021-12-14 PROCEDURE — 80048 BASIC METABOLIC PNL TOTAL CA: CPT | Performed by: EMERGENCY MEDICINE

## 2021-12-14 PROCEDURE — 99283 PR EMERGENCY DEPT VISIT,LEVEL III: ICD-10-PCS | Mod: ,,, | Performed by: EMERGENCY MEDICINE

## 2021-12-14 PROCEDURE — 99284 EMERGENCY DEPT VISIT MOD MDM: CPT | Mod: 25

## 2021-12-14 PROCEDURE — 85025 COMPLETE CBC W/AUTO DIFF WBC: CPT | Performed by: EMERGENCY MEDICINE

## 2021-12-14 PROCEDURE — 83735 ASSAY OF MAGNESIUM: CPT | Performed by: EMERGENCY MEDICINE

## 2021-12-14 PROCEDURE — 99283 EMERGENCY DEPT VISIT LOW MDM: CPT | Mod: ,,, | Performed by: EMERGENCY MEDICINE

## 2021-12-14 PROCEDURE — 36415 COLL VENOUS BLD VENIPUNCTURE: CPT | Performed by: EMERGENCY MEDICINE

## 2021-12-14 RX ORDER — SERTRALINE HYDROCHLORIDE 100 MG/1
100 TABLET, FILM COATED ORAL DAILY
COMMUNITY

## 2021-12-14 RX ORDER — INSULIN GLARGINE 100 [IU]/ML
10 INJECTION, SOLUTION SUBCUTANEOUS NIGHTLY
Status: ON HOLD | COMMUNITY
End: 2022-06-27 | Stop reason: HOSPADM

## 2021-12-14 RX ORDER — AMLODIPINE BESYLATE 5 MG/1
5 TABLET ORAL DAILY
Status: ON HOLD | COMMUNITY
End: 2022-06-22 | Stop reason: CLARIF

## 2021-12-14 RX ORDER — METFORMIN HYDROCHLORIDE 500 MG/1
500 TABLET ORAL 2 TIMES DAILY WITH MEALS
COMMUNITY

## 2021-12-14 RX ORDER — ATORVASTATIN CALCIUM 80 MG/1
80 TABLET, FILM COATED ORAL NIGHTLY
COMMUNITY

## 2021-12-14 RX ORDER — DEXTROMETHORPHAN HYDROBROMIDE, GUAIFENESIN 5; 100 MG/5ML; MG/5ML
650 LIQUID ORAL EVERY 4 HOURS PRN
COMMUNITY

## 2021-12-14 RX ORDER — NAPROXEN SODIUM 220 MG/1
81 TABLET, FILM COATED ORAL DAILY
COMMUNITY

## 2021-12-14 RX ORDER — PHENYTOIN SODIUM 200 MG/1
200 CAPSULE, EXTENDED RELEASE ORAL 2 TIMES DAILY
COMMUNITY
End: 2022-06-19 | Stop reason: SDUPTHER

## 2021-12-14 RX ORDER — MULTIVITAMIN
1 TABLET ORAL DAILY
COMMUNITY

## 2021-12-14 RX ORDER — PREDNISONE 20 MG/1
20 TABLET ORAL DAILY
Status: ON HOLD | COMMUNITY
End: 2022-06-21

## 2021-12-14 RX ORDER — LEVETIRACETAM 100 MG/ML
1000 SOLUTION ORAL 2 TIMES DAILY
COMMUNITY

## 2021-12-14 RX ORDER — VANCOMYCIN HYDROCHLORIDE 125 MG/1
125 CAPSULE ORAL 4 TIMES DAILY
Status: ON HOLD | COMMUNITY
End: 2022-06-21

## 2021-12-14 RX ORDER — DONEPEZIL HYDROCHLORIDE 10 MG/1
10 TABLET, FILM COATED ORAL NIGHTLY
COMMUNITY

## 2021-12-14 RX ORDER — POLYETHYLENE GLYCOL 3350 17 G/17G
17 POWDER, FOR SOLUTION ORAL
COMMUNITY

## 2022-06-19 ENCOUNTER — HOSPITAL ENCOUNTER (INPATIENT)
Facility: HOSPITAL | Age: 67
LOS: 2 days | Discharge: NURSING, REHAB OR LONG-TERM CARE | DRG: 177 | End: 2022-06-21
Attending: INTERNAL MEDICINE | Admitting: FAMILY MEDICINE
Payer: MEDICARE

## 2022-06-19 DIAGNOSIS — J69.0 ASPIRATION PNEUMONIA OF BOTH LOWER LOBES DUE TO REGURGITATED FOOD: ICD-10-CM

## 2022-06-19 DIAGNOSIS — J69.0 ASPIRATION PNEUMONIA: ICD-10-CM

## 2022-06-19 DIAGNOSIS — R79.89 ELEVATED TROPONIN: ICD-10-CM

## 2022-06-19 DIAGNOSIS — R07.9 CHEST PAIN: ICD-10-CM

## 2022-06-19 DIAGNOSIS — A41.9 SEPSIS: Primary | ICD-10-CM

## 2022-06-19 DIAGNOSIS — J69.0 ASPIRATION PNEUMONIA, UNSPECIFIED ASPIRATION PNEUMONIA TYPE, UNSPECIFIED LATERALITY, UNSPECIFIED PART OF LUNG: ICD-10-CM

## 2022-06-19 DIAGNOSIS — R07.2 PRECORDIAL PAIN: ICD-10-CM

## 2022-06-19 PROBLEM — Z86.73 HX OF STROKE ASSOCIATED WITH BLOOD CLOTTING TENDENCY: Status: ACTIVE | Noted: 2022-06-19

## 2022-06-19 PROBLEM — K21.9 GERD (GASTROESOPHAGEAL REFLUX DISEASE): Status: ACTIVE | Noted: 2022-06-19

## 2022-06-19 PROBLEM — E11.9 DM2 (DIABETES MELLITUS, TYPE 2): Status: ACTIVE | Noted: 2022-06-19

## 2022-06-19 PROBLEM — Z86.19 HX OF CLOSTRIDIUM DIFFICILE INFECTION: Status: ACTIVE | Noted: 2022-06-19

## 2022-06-19 LAB
ALBUMIN SERPL BCP-MCNC: 3 G/DL (ref 3.5–5)
ALBUMIN/GLOB SERPL: 0.5 {RATIO}
ALP SERPL-CCNC: 222 U/L (ref 45–115)
ALT SERPL W P-5'-P-CCNC: 60 U/L (ref 16–61)
ANION GAP SERPL CALCULATED.3IONS-SCNC: 15 MMOL/L (ref 7–16)
APTT PPP: 103.3 SECONDS (ref 25.2–37.3)
AST SERPL W P-5'-P-CCNC: 53 U/L (ref 15–37)
BACTERIA #/AREA URNS HPF: ABNORMAL /HPF
BASOPHILS # BLD AUTO: 0.03 K/UL (ref 0–0.2)
BASOPHILS # BLD AUTO: 0.05 K/UL (ref 0–0.2)
BASOPHILS NFR BLD AUTO: 0.3 % (ref 0–1)
BASOPHILS NFR BLD AUTO: 0.4 % (ref 0–1)
BASOPHILS NFR BLD MANUAL: 1 % (ref 0–1)
BILIRUB SERPL-MCNC: 0.2 MG/DL (ref 0–1.2)
BILIRUB UR QL STRIP: NEGATIVE
BUN SERPL-MCNC: 53 MG/DL (ref 7–18)
BUN/CREAT SERPL: 41 (ref 6–20)
CALCIUM SERPL-MCNC: 9.5 MG/DL (ref 8.5–10.1)
CAOX CRY #/AREA URNS LPF: ABNORMAL /LPF
CHLORIDE SERPL-SCNC: 111 MMOL/L (ref 98–107)
CLARITY UR: ABNORMAL
CO2 SERPL-SCNC: 32 MMOL/L (ref 21–32)
COLOR UR: YELLOW
CREAT SERPL-MCNC: 1.28 MG/DL (ref 0.7–1.3)
DIFFERENTIAL METHOD BLD: ABNORMAL
DIFFERENTIAL METHOD BLD: ABNORMAL
EOSINOPHIL # BLD AUTO: 0 K/UL (ref 0–0.5)
EOSINOPHIL # BLD AUTO: 0.03 K/UL (ref 0–0.5)
EOSINOPHIL NFR BLD AUTO: 0 % (ref 1–4)
EOSINOPHIL NFR BLD AUTO: 0.3 % (ref 1–4)
ERYTHROCYTE [DISTWIDTH] IN BLOOD BY AUTOMATED COUNT: 14 % (ref 11.5–14.5)
ERYTHROCYTE [DISTWIDTH] IN BLOOD BY AUTOMATED COUNT: 14.2 % (ref 11.5–14.5)
EST. AVERAGE GLUCOSE BLD GHB EST-MCNC: 117 MG/DL
FLUAV AG UPPER RESP QL IA.RAPID: NEGATIVE
FLUBV AG UPPER RESP QL IA.RAPID: NEGATIVE
GLOBULIN SER-MCNC: 6 G/DL (ref 2–4)
GLUCOSE SERPL-MCNC: 161 MG/DL (ref 70–105)
GLUCOSE SERPL-MCNC: 198 MG/DL (ref 70–105)
GLUCOSE SERPL-MCNC: 306 MG/DL (ref 74–106)
GLUCOSE UR STRIP-MCNC: NEGATIVE MG/DL
HBA1C MFR BLD HPLC: 6.1 % (ref 4.5–6.6)
HCO3 UR-SCNC: 37.4 MMOL/L (ref 21–28)
HCT VFR BLD AUTO: 30.5 % (ref 40–54)
HCT VFR BLD AUTO: 32.1 % (ref 40–54)
HGB BLD-MCNC: 10.1 G/DL (ref 13.5–18)
HGB BLD-MCNC: 8.9 G/DL (ref 13.5–18)
HYALINE CASTS #/AREA URNS LPF: ABNORMAL /LPF
HYPOCHROMIA BLD QL SMEAR: ABNORMAL
HYPOCHROMIA BLD QL SMEAR: NORMAL
IMM GRANULOCYTES # BLD AUTO: 0.03 K/UL (ref 0–0.04)
IMM GRANULOCYTES # BLD AUTO: 0.07 K/UL (ref 0–0.04)
IMM GRANULOCYTES NFR BLD: 0.3 % (ref 0–0.4)
IMM GRANULOCYTES NFR BLD: 0.5 % (ref 0–0.4)
INR BLD: 1.23 (ref 0.9–1.1)
KETONES UR STRIP-SCNC: ABNORMAL MG/DL
LACTATE SERPL-SCNC: 0.7 MMOL/L (ref 0.4–2)
LACTATE SERPL-SCNC: 3.4 MMOL/L (ref 0.4–2)
LEUKOCYTE ESTERASE UR QL STRIP: ABNORMAL
LYMPHOCYTES # BLD AUTO: 0.64 K/UL (ref 1–4.8)
LYMPHOCYTES # BLD AUTO: 1.73 K/UL (ref 1–4.8)
LYMPHOCYTES NFR BLD AUTO: 16 % (ref 27–41)
LYMPHOCYTES NFR BLD AUTO: 4.5 % (ref 27–41)
LYMPHOCYTES NFR BLD MANUAL: 4 % (ref 27–41)
MACROCYTES BLD QL SMEAR: NORMAL
MCH RBC QN AUTO: 30.7 PG (ref 27–31)
MCH RBC QN AUTO: 30.9 PG (ref 27–31)
MCHC RBC AUTO-ENTMCNC: 29.2 G/DL (ref 32–36)
MCHC RBC AUTO-ENTMCNC: 31.5 G/DL (ref 32–36)
MCV RBC AUTO: 105.2 FL (ref 80–96)
MCV RBC AUTO: 98.2 FL (ref 80–96)
MONOCYTES # BLD AUTO: 0.52 K/UL (ref 0–0.8)
MONOCYTES # BLD AUTO: 0.8 K/UL (ref 0–0.8)
MONOCYTES NFR BLD AUTO: 3.7 % (ref 2–6)
MONOCYTES NFR BLD AUTO: 7.4 % (ref 2–6)
MONOCYTES NFR BLD MANUAL: 2 % (ref 2–6)
MPC BLD CALC-MCNC: 12.2 FL (ref 9.4–12.4)
MPC BLD CALC-MCNC: 12.3 FL (ref 9.4–12.4)
MUCOUS THREADS #/AREA URNS HPF: ABNORMAL /HPF
NEUTROPHILS # BLD AUTO: 12.91 K/UL (ref 1.8–7.7)
NEUTROPHILS # BLD AUTO: 8.2 K/UL (ref 1.8–7.7)
NEUTROPHILS NFR BLD AUTO: 75.7 % (ref 53–65)
NEUTROPHILS NFR BLD AUTO: 90.9 % (ref 53–65)
NEUTS BAND NFR BLD MANUAL: 2 % (ref 1–5)
NEUTS SEG NFR BLD MANUAL: 91 % (ref 50–62)
NITRITE UR QL STRIP: NEGATIVE
NRBC # BLD AUTO: 0 X10E3/UL
NRBC # BLD AUTO: 0 X10E3/UL
NRBC, AUTO (.00): 0 %
NRBC, AUTO (.00): 0 %
NT-PROBNP SERPL-MCNC: 267 PG/ML (ref 1–125)
PCO2 BLDA: 55 MMHG (ref 35–48)
PH SMN: 7.44 [PH] (ref 7.35–7.45)
PH UR STRIP: 5 PH UNITS
PLATELET # BLD AUTO: 157 K/UL (ref 150–400)
PLATELET # BLD AUTO: 191 K/UL (ref 150–400)
PLATELET MORPHOLOGY: ABNORMAL
PLATELET MORPHOLOGY: NORMAL
PO2 BLDA: 81 MMHG (ref 83–108)
POC BASE EXCESS: 11.3 MMOL/L (ref -2–3)
POC SATURATED O2: 96 % (ref 95–98)
POTASSIUM SERPL-SCNC: 5 MMOL/L (ref 3.5–5.1)
PROT SERPL-MCNC: 9 G/DL (ref 6.4–8.2)
PROT UR QL STRIP: >=300
PROTHROMBIN TIME: 15 SECONDS (ref 11.7–14.7)
RBC # BLD AUTO: 2.9 M/UL (ref 4.6–6.2)
RBC # BLD AUTO: 3.27 M/UL (ref 4.6–6.2)
RBC # UR STRIP: ABNORMAL /UL
RBC #/AREA URNS HPF: ABNORMAL /HPF
SARS-COV+SARS-COV-2 AG RESP QL IA.RAPID: NEGATIVE
SODIUM SERPL-SCNC: 153 MMOL/L (ref 136–145)
SP GR UR STRIP: 1.03
SQUAMOUS #/AREA URNS LPF: ABNORMAL /LPF
TROPONIN I SERPL HS-MCNC: 132.2 PG/ML
TROPONIN I SERPL HS-MCNC: 79.8 PG/ML
TROPONIN I SERPL HS-MCNC: 92.2 PG/ML
UROBILINOGEN UR STRIP-ACNC: 0.2 MG/DL
WBC # BLD AUTO: 10.82 K/UL (ref 4.5–11)
WBC # BLD AUTO: 14.19 K/UL (ref 4.5–11)
WBC #/AREA URNS HPF: ABNORMAL /HPF

## 2022-06-19 PROCEDURE — 93010 ELECTROCARDIOGRAM REPORT: CPT | Mod: ,,, | Performed by: HOSPITALIST

## 2022-06-19 PROCEDURE — 25000003 PHARM REV CODE 250: Performed by: NURSE PRACTITIONER

## 2022-06-19 PROCEDURE — 25000003 PHARM REV CODE 250: Performed by: FAMILY MEDICINE

## 2022-06-19 PROCEDURE — 83605 ASSAY OF LACTIC ACID: CPT | Performed by: NURSE PRACTITIONER

## 2022-06-19 PROCEDURE — 11000001 HC ACUTE MED/SURG PRIVATE ROOM

## 2022-06-19 PROCEDURE — 96361 HYDRATE IV INFUSION ADD-ON: CPT

## 2022-06-19 PROCEDURE — 27000221 HC OXYGEN, UP TO 24 HOURS

## 2022-06-19 PROCEDURE — 93010 ELECTROCARDIOGRAM REPORT: CPT | Mod: 76,,, | Performed by: HOSPITALIST

## 2022-06-19 PROCEDURE — 83880 ASSAY OF NATRIURETIC PEPTIDE: CPT | Performed by: NURSE PRACTITIONER

## 2022-06-19 PROCEDURE — 85025 COMPLETE CBC W/AUTO DIFF WBC: CPT

## 2022-06-19 PROCEDURE — 80053 COMPREHEN METABOLIC PANEL: CPT | Performed by: NURSE PRACTITIONER

## 2022-06-19 PROCEDURE — 94761 N-INVAS EAR/PLS OXIMETRY MLT: CPT

## 2022-06-19 PROCEDURE — C9113 INJ PANTOPRAZOLE SODIUM, VIA: HCPCS

## 2022-06-19 PROCEDURE — 84484 ASSAY OF TROPONIN QUANT: CPT | Performed by: NURSE PRACTITIONER

## 2022-06-19 PROCEDURE — 87040 BLOOD CULTURE FOR BACTERIA: CPT | Performed by: NURSE PRACTITIONER

## 2022-06-19 PROCEDURE — 87428 SARSCOV & INF VIR A&B AG IA: CPT | Performed by: NURSE PRACTITIONER

## 2022-06-19 PROCEDURE — 96365 THER/PROPH/DIAG IV INF INIT: CPT

## 2022-06-19 PROCEDURE — 85610 PROTHROMBIN TIME: CPT

## 2022-06-19 PROCEDURE — 93005 ELECTROCARDIOGRAM TRACING: CPT

## 2022-06-19 PROCEDURE — 63600175 PHARM REV CODE 636 W HCPCS: Performed by: NURSE PRACTITIONER

## 2022-06-19 PROCEDURE — 85730 THROMBOPLASTIN TIME PARTIAL: CPT

## 2022-06-19 PROCEDURE — 99223 PR INITIAL HOSPITAL CARE,LEVL III: ICD-10-PCS | Mod: AI,GC,, | Performed by: FAMILY MEDICINE

## 2022-06-19 PROCEDURE — 25000003 PHARM REV CODE 250

## 2022-06-19 PROCEDURE — 99285 PR EMERGENCY DEPT VISIT,LEVEL V: ICD-10-PCS | Mod: ,,, | Performed by: NURSE PRACTITIONER

## 2022-06-19 PROCEDURE — 94640 AIRWAY INHALATION TREATMENT: CPT

## 2022-06-19 PROCEDURE — 85025 COMPLETE CBC W/AUTO DIFF WBC: CPT | Performed by: NURSE PRACTITIONER

## 2022-06-19 PROCEDURE — 63600175 PHARM REV CODE 636 W HCPCS

## 2022-06-19 PROCEDURE — 82962 GLUCOSE BLOOD TEST: CPT

## 2022-06-19 PROCEDURE — 36415 COLL VENOUS BLD VENIPUNCTURE: CPT | Performed by: NURSE PRACTITIONER

## 2022-06-19 PROCEDURE — 83036 HEMOGLOBIN GLYCOSYLATED A1C: CPT

## 2022-06-19 PROCEDURE — 99285 EMERGENCY DEPT VISIT HI MDM: CPT | Mod: 25

## 2022-06-19 PROCEDURE — 84484 ASSAY OF TROPONIN QUANT: CPT

## 2022-06-19 PROCEDURE — 25000242 PHARM REV CODE 250 ALT 637 W/ HCPCS

## 2022-06-19 PROCEDURE — 81001 URINALYSIS AUTO W/SCOPE: CPT | Performed by: NURSE PRACTITIONER

## 2022-06-19 PROCEDURE — 87086 URINE CULTURE/COLONY COUNT: CPT | Performed by: NURSE PRACTITIONER

## 2022-06-19 PROCEDURE — 99285 EMERGENCY DEPT VISIT HI MDM: CPT | Mod: ,,, | Performed by: NURSE PRACTITIONER

## 2022-06-19 PROCEDURE — 63600175 PHARM REV CODE 636 W HCPCS: Performed by: FAMILY MEDICINE

## 2022-06-19 PROCEDURE — 99223 1ST HOSP IP/OBS HIGH 75: CPT | Mod: AI,GC,, | Performed by: FAMILY MEDICINE

## 2022-06-19 PROCEDURE — 93010 EKG 12-LEAD: ICD-10-PCS | Mod: ,,, | Performed by: HOSPITALIST

## 2022-06-19 PROCEDURE — 84075 ASSAY ALKALINE PHOSPHATASE: CPT | Performed by: NURSE PRACTITIONER

## 2022-06-19 PROCEDURE — 99900035 HC TECH TIME PER 15 MIN (STAT)

## 2022-06-19 PROCEDURE — 36600 WITHDRAWAL OF ARTERIAL BLOOD: CPT

## 2022-06-19 PROCEDURE — 82803 BLOOD GASES ANY COMBINATION: CPT

## 2022-06-19 RX ORDER — APIXABAN 5 MG/1
5 TABLET, FILM COATED ORAL 2 TIMES DAILY
COMMUNITY
Start: 2022-06-17

## 2022-06-19 RX ORDER — SODIUM CHLORIDE 9 MG/ML
INJECTION, SOLUTION INTRAVENOUS
Status: DISPENSED
Start: 2022-06-19 | End: 2022-06-19

## 2022-06-19 RX ORDER — PHENYTOIN 125 MG/5ML
8 SUSPENSION ORAL 2 TIMES DAILY
COMMUNITY
Start: 2022-05-27

## 2022-06-19 RX ORDER — SODIUM CHLORIDE 0.9 % (FLUSH) 0.9 %
10 SYRINGE (ML) INJECTION EVERY 12 HOURS PRN
Status: DISCONTINUED | OUTPATIENT
Start: 2022-06-19 | End: 2022-06-21 | Stop reason: HOSPADM

## 2022-06-19 RX ORDER — SODIUM CHLORIDE 9 MG/ML
INJECTION, SOLUTION INTRAVENOUS CONTINUOUS
Status: DISCONTINUED | OUTPATIENT
Start: 2022-06-19 | End: 2022-06-19

## 2022-06-19 RX ORDER — HEPARIN SODIUM,PORCINE/D5W 25000/250
0-40 INTRAVENOUS SOLUTION INTRAVENOUS CONTINUOUS
Status: DISCONTINUED | OUTPATIENT
Start: 2022-06-19 | End: 2022-06-20

## 2022-06-19 RX ORDER — ATORVASTATIN CALCIUM 80 MG/1
80 TABLET, FILM COATED ORAL DAILY
Status: DISCONTINUED | OUTPATIENT
Start: 2022-06-19 | End: 2022-06-21 | Stop reason: HOSPADM

## 2022-06-19 RX ORDER — ASPIRIN 81 MG/1
81 TABLET ORAL DAILY
Status: DISCONTINUED | OUTPATIENT
Start: 2022-06-20 | End: 2022-06-21 | Stop reason: HOSPADM

## 2022-06-19 RX ORDER — ASPIRIN 325 MG
325 TABLET ORAL ONCE
Status: COMPLETED | OUTPATIENT
Start: 2022-06-19 | End: 2022-06-19

## 2022-06-19 RX ORDER — IBUPROFEN 200 MG
24 TABLET ORAL
Status: DISCONTINUED | OUTPATIENT
Start: 2022-06-19 | End: 2022-06-21 | Stop reason: HOSPADM

## 2022-06-19 RX ORDER — INSULIN ASPART 100 [IU]/ML
1-10 INJECTION, SOLUTION INTRAVENOUS; SUBCUTANEOUS
Status: DISCONTINUED | OUTPATIENT
Start: 2022-06-19 | End: 2022-06-21 | Stop reason: HOSPADM

## 2022-06-19 RX ORDER — DONEPEZIL HYDROCHLORIDE 5 MG/1
10 TABLET, FILM COATED ORAL NIGHTLY
Status: DISCONTINUED | OUTPATIENT
Start: 2022-06-19 | End: 2022-06-21 | Stop reason: HOSPADM

## 2022-06-19 RX ORDER — IPRATROPIUM BROMIDE AND ALBUTEROL SULFATE 2.5; .5 MG/3ML; MG/3ML
3 SOLUTION RESPIRATORY (INHALATION) EVERY 6 HOURS
Status: DISCONTINUED | OUTPATIENT
Start: 2022-06-19 | End: 2022-06-21 | Stop reason: HOSPADM

## 2022-06-19 RX ORDER — OMEPRAZOLE 20 MG/1
20 CAPSULE, DELAYED RELEASE ORAL DAILY
COMMUNITY

## 2022-06-19 RX ORDER — IBUPROFEN 200 MG
16 TABLET ORAL
Status: DISCONTINUED | OUTPATIENT
Start: 2022-06-19 | End: 2022-06-21 | Stop reason: HOSPADM

## 2022-06-19 RX ORDER — GLUCAGON 1 MG
1 KIT INJECTION
Status: DISCONTINUED | OUTPATIENT
Start: 2022-06-19 | End: 2022-06-21 | Stop reason: HOSPADM

## 2022-06-19 RX ORDER — PANTOPRAZOLE SODIUM 40 MG/10ML
40 INJECTION, POWDER, LYOPHILIZED, FOR SOLUTION INTRAVENOUS DAILY
Status: DISCONTINUED | OUTPATIENT
Start: 2022-06-19 | End: 2022-06-21 | Stop reason: HOSPADM

## 2022-06-19 RX ORDER — ASPIRIN 325 MG
325 TABLET ORAL DAILY
Status: DISCONTINUED | OUTPATIENT
Start: 2022-06-19 | End: 2022-06-19

## 2022-06-19 RX ORDER — ONDANSETRON 2 MG/ML
4 INJECTION INTRAMUSCULAR; INTRAVENOUS EVERY 8 HOURS PRN
Status: DISCONTINUED | OUTPATIENT
Start: 2022-06-19 | End: 2022-06-21 | Stop reason: HOSPADM

## 2022-06-19 RX ORDER — METOPROLOL TARTRATE 25 MG/1
12.5 TABLET ORAL 2 TIMES DAILY
Status: DISCONTINUED | OUTPATIENT
Start: 2022-06-19 | End: 2022-06-21 | Stop reason: HOSPADM

## 2022-06-19 RX ORDER — ACETAMINOPHEN 325 MG/1
650 TABLET ORAL EVERY 8 HOURS PRN
Status: DISCONTINUED | OUTPATIENT
Start: 2022-06-19 | End: 2022-06-21 | Stop reason: HOSPADM

## 2022-06-19 RX ORDER — POLYETHYLENE GLYCOL 3350 17 G/17G
17 POWDER, FOR SOLUTION ORAL DAILY
Status: DISCONTINUED | OUTPATIENT
Start: 2022-06-19 | End: 2022-06-21 | Stop reason: HOSPADM

## 2022-06-19 RX ORDER — SERTRALINE HYDROCHLORIDE 50 MG/1
100 TABLET, FILM COATED ORAL DAILY
Status: DISCONTINUED | OUTPATIENT
Start: 2022-06-19 | End: 2022-06-21 | Stop reason: HOSPADM

## 2022-06-19 RX ORDER — PHENYTOIN 125 MG/5ML
200 SUSPENSION ORAL 2 TIMES DAILY
Status: DISCONTINUED | OUTPATIENT
Start: 2022-06-19 | End: 2022-06-21 | Stop reason: HOSPADM

## 2022-06-19 RX ORDER — SODIUM CHLORIDE 9 MG/ML
INJECTION, SOLUTION INTRAVENOUS CONTINUOUS
Status: DISCONTINUED | OUTPATIENT
Start: 2022-06-19 | End: 2022-06-20

## 2022-06-19 RX ORDER — ALBUTEROL SULFATE 0.83 MG/ML
2.5 SOLUTION RESPIRATORY (INHALATION)
COMMUNITY
Start: 2022-01-19

## 2022-06-19 RX ORDER — AMLODIPINE BESYLATE 5 MG/1
5 TABLET ORAL DAILY
Status: DISCONTINUED | OUTPATIENT
Start: 2022-06-19 | End: 2022-06-21 | Stop reason: HOSPADM

## 2022-06-19 RX ORDER — IPRATROPIUM BROMIDE AND ALBUTEROL SULFATE 2.5; .5 MG/3ML; MG/3ML
3 SOLUTION RESPIRATORY (INHALATION) EVERY 6 HOURS
COMMUNITY
Start: 2022-06-15

## 2022-06-19 RX ORDER — LEVETIRACETAM 500 MG/1
1000 TABLET ORAL 2 TIMES DAILY
Status: DISCONTINUED | OUTPATIENT
Start: 2022-06-19 | End: 2022-06-21 | Stop reason: HOSPADM

## 2022-06-19 RX ADMIN — DONEPEZIL HYDROCHLORIDE 10 MG: 5 TABLET ORAL at 09:06

## 2022-06-19 RX ADMIN — SERTRALINE HYDROCHLORIDE 100 MG: 50 TABLET ORAL at 04:06

## 2022-06-19 RX ADMIN — SODIUM CHLORIDE: 9 INJECTION, SOLUTION INTRAVENOUS at 03:06

## 2022-06-19 RX ADMIN — METOPROLOL TARTRATE 12.5 MG: 25 TABLET, FILM COATED ORAL at 04:06

## 2022-06-19 RX ADMIN — IPRATROPIUM BROMIDE AND ALBUTEROL SULFATE 3 ML: .5; 3 SOLUTION RESPIRATORY (INHALATION) at 07:06

## 2022-06-19 RX ADMIN — LEVETIRACETAM 1000 MG: 500 TABLET, FILM COATED ORAL at 04:06

## 2022-06-19 RX ADMIN — CEFTRIAXONE SODIUM 1 G: 1 INJECTION, POWDER, FOR SOLUTION INTRAMUSCULAR; INTRAVENOUS at 08:06

## 2022-06-19 RX ADMIN — PIPERACILLIN AND TAZOBACTAM 4.5 G: 4; .5 INJECTION, POWDER, LYOPHILIZED, FOR SOLUTION INTRAVENOUS; PARENTERAL at 03:06

## 2022-06-19 RX ADMIN — PHENYTOIN 200 MG: 125 SUSPENSION ORAL at 09:06

## 2022-06-19 RX ADMIN — SODIUM CHLORIDE 2244 ML: 9 INJECTION, SOLUTION INTRAVENOUS at 08:06

## 2022-06-19 RX ADMIN — AMLODIPINE BESYLATE 5 MG: 5 TABLET ORAL at 04:06

## 2022-06-19 RX ADMIN — POLYETHYLENE GLYCOL 3350 17 G: 17 POWDER, FOR SOLUTION ORAL at 04:06

## 2022-06-19 RX ADMIN — HEPARIN SODIUM 12 UNITS/KG/HR: 10000 INJECTION, SOLUTION INTRAVENOUS at 04:06

## 2022-06-19 RX ADMIN — VANCOMYCIN HYDROCHLORIDE 1500 MG: 5 INJECTION, POWDER, LYOPHILIZED, FOR SOLUTION INTRAVENOUS at 06:06

## 2022-06-19 RX ADMIN — PANTOPRAZOLE SODIUM 40 MG: 40 INJECTION, POWDER, FOR SOLUTION INTRAVENOUS at 03:06

## 2022-06-19 RX ADMIN — IPRATROPIUM BROMIDE AND ALBUTEROL SULFATE 3 ML: .5; 3 SOLUTION RESPIRATORY (INHALATION) at 04:06

## 2022-06-19 RX ADMIN — ATORVASTATIN CALCIUM 80 MG: 80 TABLET, FILM COATED ORAL at 04:06

## 2022-06-19 RX ADMIN — ASPIRIN 325 MG ORAL TABLET 325 MG: 325 PILL ORAL at 04:06

## 2022-06-19 RX ADMIN — PIPERACILLIN AND TAZOBACTAM 4.5 G: 4; .5 INJECTION, POWDER, FOR SOLUTION INTRAVENOUS at 08:06

## 2022-06-19 NOTE — HPI
67 year old with PMHx of CVA, Left BKA, encephalopathy, dysphagia HTN, came in from [facility] due to SOB. Patient is non verbal and has encephalopathy therefore Hx was obtained from [facility] personal via a phone conversation and limited Hx available in his chart. He was found having SOB, gastric content around.in his mouth and abnormal vital signs therefore was brought to the [facility] via EMS. Patient has PEG tube and Upon suctioning his mouth in the ED, PEG feeding content was noticed.     Patient has had a CVA that has left him with encephalopathy. He presented with AMS therefore there was concern regarding a possible stroke but [facility] reports that this is his normal state. Patient had a Aspiration pneumonia back in December 2021 which was treated in [facility] then patient also had a PEG. Patient has DM2 and takes Lantus 10 units nightly. He has hemrrhoids. Per chart he has peripheral vascular disease and takes atorvastatin. He has Hx of seizure and per charts e takes both Keppra and Phenytoin. Other PMHx includes GERD and MDD.     ED course:  Vitals: Patient meets septic criteria with pulse of 132, RR 42 bp 112/65 temp 97.9 and a known source of infection  Chest xray: Patchy right basilar densities are nonspecific and may represent infectious/inflammatory change versus atelectasis.  CBC: WBC 14.19 Hgb of 10.1  BMP: sodium of 153, glucose of 306, alk phosph 222   Lactic acid was 3.4- second take is pending  Troponin was 78.8 and trending  Probnp 267

## 2022-06-19 NOTE — ASSESSMENT & PLAN NOTE
Patient met the criteria for sepsis on presentation  WBC of 14.19  A bolus of Nacl 1 L was ordered  Nacl with rate of 200 cc/hr to be continued   Zosyn and Vanc  ABG pending

## 2022-06-19 NOTE — ASSESSMENT & PLAN NOTE
Patient takes eliquis and ASA at the nursing home  Nursing home staff reported he is non verbal, has dementia/encephalopathy, dysphagia  Current state seems to be the baseline per [place] staff  Holding these 2 medications for now until sudden AMS is fully r/o

## 2022-06-19 NOTE — ASSESSMENT & PLAN NOTE
Patient takes Lantus 10 units nightly at home  A1c pending  SSI for now and will adjust accordingly

## 2022-06-19 NOTE — CARE UPDATE
Troponin increased from 79.8 to 132.2 - will continue trending  , metoprolol tartrate 12.5 BID and Heparin drip was started  Cardiology is consulted  Echo is pending- since patient is tachycardic will be considering any changes in the right side pressures as well to r/o PE  LE doppler pending

## 2022-06-19 NOTE — SUBJECTIVE & OBJECTIVE
Past Medical History:   Diagnosis Date    Coronary artery disease     Dementia     Depression     Diabetes mellitus     GERD (gastroesophageal reflux disease)     PVD (peripheral vascular disease)     Seizures     Stroke        Past Surgical History:   Procedure Laterality Date    BELOW KNEE AMPUTATION OF LOWER EXTREMITY Left     GASTROSTOMY TUBE PLACEMENT         Review of patient's allergies indicates:  No Known Allergies    No current facility-administered medications on file prior to encounter.     Current Outpatient Medications on File Prior to Encounter   Medication Sig    albuterol (PROVENTIL) 2.5 mg /3 mL (0.083 %) nebulizer solution Take by nebulization.    albuterol-ipratropium (DUO-NEB) 2.5 mg-0.5 mg/3 mL nebulizer solution SMARTSIG:3 Milliliter(s) Via Nebulizer Every 6 Hours    aspirin (ECOTRIN) 81 MG EC tablet Take 81 mg by mouth once daily.    atorvastatin (LIPITOR) 80 MG tablet Take 80 mg by mouth once daily.    donepeziL (ARICEPT) 10 MG tablet Take 10 mg by mouth every evening.    ELIQUIS 5 mg Tab Take 5 mg by mouth 2 (two) times a day.    insulin glargine (LANTUS) 100 unit/mL injection Inject 10 Units into the skin every evening.    levETIRAcetam (KEPPRA) 1000 MG tablet Take 1,000 mg by mouth 2 (two) times daily.    metFORMIN (GLUCOPHAGE) 500 MG tablet Take 500 mg by mouth 2 (two) times daily with meals.    multivitamin (THERAGRAN) per tablet Take 1 tablet by mouth once daily.    omeprazole (PRILOSEC) 20 MG capsule Take 20 mg by mouth once daily.    phenytoin (DILANTIN) 125 mg/5 mL suspension Take 8 mLs by mouth 2 (two) times a day.    polyethylene glycol (GLYCOLAX) 17 gram PwPk Take 17 g by mouth once daily.    sertraline (ZOLOFT) 100 MG tablet Take 100 mg by mouth once daily.    acetaminophen (TYLENOL) 650 MG TbSR Take 650 mg by mouth every 4 (four) hours as needed.    amLODIPine (NORVASC) 5 MG tablet Take 5 mg by mouth once daily.    predniSONE (DELTASONE) 20 MG tablet Take 20 mg by mouth once  daily.    vancomycin (VANCOCIN) 125 MG capsule Take 125 mg by mouth 4 (four) times daily.    [DISCONTINUED] phenytoin (DILANTIN) 200 MG ER capsule Take 200 mg by mouth 2 (two) times daily.     Family History    None       Tobacco Use    Smoking status: Unknown If Ever Smoked    Smokeless tobacco: Never Used   Substance and Sexual Activity    Alcohol use: Not Currently    Drug use: Not Currently    Sexual activity: Not Currently     Review of Systems   Unable to perform ROS: Dementia   Objective:     Vital Signs (Most Recent):  Temp: 97.9 °F (36.6 °C) (06/19/22 0752)  Pulse: (!) 113 (06/19/22 1007)  Resp: (!) 26 (06/19/22 1007)  BP: 139/87 (06/19/22 1007)  SpO2: 99 % (06/19/22 1007)   Vital Signs (24h Range):  Temp:  [97.9 °F (36.6 °C)] 97.9 °F (36.6 °C)  Pulse:  [113-132] 113  Resp:  [26-42] 26  SpO2:  [93 %-99 %] 99 %  BP: (112-139)/(65-89) 139/87     Weight: 74.8 kg (165 lb)  Body mass index is 23.68 kg/m².    Physical Exam  Vitals and nursing note reviewed.   Constitutional:       General: He is in acute distress.      Appearance: He is ill-appearing. He is not toxic-appearing.   HENT:      Head: Normocephalic.      Right Ear: External ear normal.      Left Ear: External ear normal.      Mouth/Throat:      Mouth: Mucous membranes are moist.   Eyes:      General:         Right eye: No discharge.         Left eye: No discharge.      Conjunctiva/sclera: Conjunctivae normal.   Cardiovascular:      Rate and Rhythm: Regular rhythm. Tachycardia present.      Pulses: Normal pulses.      Heart sounds: Normal heart sounds. No murmur heard.  Pulmonary:      Breath sounds: Wheezing and rales present.   Chest:      Chest wall: No tenderness.   Abdominal:      General: Bowel sounds are normal. There is no distension.      Tenderness: There is no abdominal tenderness. There is no guarding.   Musculoskeletal:      Cervical back: Neck supple.      Right lower leg: No edema.      Left lower leg: No edema.   Skin:     General: Skin  is warm.      Findings: Lesion present.   Neurological:      Mental Status: He is unresponsive.           Significant Labs: All pertinent labs within the past 24 hours have been reviewed.    Significant Imaging: I have reviewed all pertinent imaging results/findings within the past 24 hours.

## 2022-06-19 NOTE — PROGRESS NOTES
Consulted to assist with vancomycin dosing.  Based on pt wt of 75kg and CrCl 58ml/min, we will begin Vancomycin 1500mg IV q24hrs. We will monitor daily and adjust as needed with a target trough of 15-20.

## 2022-06-19 NOTE — H&P
[facility] Emergency Department  Hospital Medicine  History & Physical    Patient Name: Mahesh Acuña  MRN: [id number]  Patient Class: IP- Inpatient  Admission Date: 6/19/2022  Attending Physician: Zion Dallas MD   Primary Care Provider: Primary Doctor No         Patient information was obtained from nursing home, past medical records and ER records.     Subjective:     Principal Problem:Aspiration pneumonia    Chief Complaint:   Chief Complaint   Patient presents with    Shortness of Breath        HPI: 67 year old with PMHx of CVA, Left BKA, encephalopathy, dysphagia HTN, came in from [facility] due to SOB. Patient is non verbal and has encephalopathy therefore Hx was obtained from [facility] personal via a phone conversation and limited Hx available in his chart. He was found having SOB, gastric content around.in his mouth and abnormal vital signs therefore was brought to the [facility] via EMS. Patient has PEG tube and Upon suctioning his mouth in the ED, PEG feeding content was noticed.     Patient has had a CVA that has left him with encephalopathy. He presented with AMS therefore there was concern regarding a possible stroke but [facility] reports that this is his normal state. Patient had a Aspiration pneumonia back in December 2021 which was treated in [facility] then patient also had a PEG. Patient has DM2 and takes Lantus 10 units nightly. He has hemrrhoids. Per chart he has peripheral vascular disease and takes atorvastatin. He has Hx of seizure and per charts e takes both Keppra and Phenytoin. Other PMHx includes GERD and MDD.     ED course:  Vitals: Patient meets septic criteria with pulse of 132, RR 42 bp 112/65 temp 97.9 and a known source of infection  Chest xray: Patchy right basilar densities are nonspecific and may represent infectious/inflammatory change versus atelectasis.  CBC: WBC 14.19 Hgb of 10.1  BMP: sodium of 153, glucose of 306, alk phosph 222   Lactic acid was 3.4- second take is  pending  Troponin was 78.8 and trending  Probnp 267      Past Medical History:   Diagnosis Date    Coronary artery disease     Dementia     Depression     Diabetes mellitus     GERD (gastroesophageal reflux disease)     PVD (peripheral vascular disease)     Seizures     Stroke        Past Surgical History:   Procedure Laterality Date    BELOW KNEE AMPUTATION OF LOWER EXTREMITY Left     GASTROSTOMY TUBE PLACEMENT         Review of patient's allergies indicates:  No Known Allergies    No current facility-administered medications on file prior to encounter.     Current Outpatient Medications on File Prior to Encounter   Medication Sig    albuterol (PROVENTIL) 2.5 mg /3 mL (0.083 %) nebulizer solution Take by nebulization.    albuterol-ipratropium (DUO-NEB) 2.5 mg-0.5 mg/3 mL nebulizer solution SMARTSIG:3 Milliliter(s) Via Nebulizer Every 6 Hours    aspirin (ECOTRIN) 81 MG EC tablet Take 81 mg by mouth once daily.    atorvastatin (LIPITOR) 80 MG tablet Take 80 mg by mouth once daily.    donepeziL (ARICEPT) 10 MG tablet Take 10 mg by mouth every evening.    ELIQUIS 5 mg Tab Take 5 mg by mouth 2 (two) times a day.    insulin glargine (LANTUS) 100 unit/mL injection Inject 10 Units into the skin every evening.    levETIRAcetam (KEPPRA) 1000 MG tablet Take 1,000 mg by mouth 2 (two) times daily.    metFORMIN (GLUCOPHAGE) 500 MG tablet Take 500 mg by mouth 2 (two) times daily with meals.    multivitamin (THERAGRAN) per tablet Take 1 tablet by mouth once daily.    omeprazole (PRILOSEC) 20 MG capsule Take 20 mg by mouth once daily.    phenytoin (DILANTIN) 125 mg/5 mL suspension Take 8 mLs by mouth 2 (two) times a day.    polyethylene glycol (GLYCOLAX) 17 gram PwPk Take 17 g by mouth once daily.    sertraline (ZOLOFT) 100 MG tablet Take 100 mg by mouth once daily.    acetaminophen (TYLENOL) 650 MG TbSR Take 650 mg by mouth every 4 (four) hours as needed.    amLODIPine (NORVASC) 5 MG tablet Take 5 mg by  mouth once daily.    predniSONE (DELTASONE) 20 MG tablet Take 20 mg by mouth once daily.    vancomycin (VANCOCIN) 125 MG capsule Take 125 mg by mouth 4 (four) times daily.    [DISCONTINUED] phenytoin (DILANTIN) 200 MG ER capsule Take 200 mg by mouth 2 (two) times daily.     Family History    None       Tobacco Use    Smoking status: Unknown If Ever Smoked    Smokeless tobacco: Never Used   Substance and Sexual Activity    Alcohol use: Not Currently    Drug use: Not Currently    Sexual activity: Not Currently     Review of Systems   Unable to perform ROS: Dementia   Objective:     Vital Signs (Most Recent):  Temp: 97.9 °F (36.6 °C) (06/19/22 0752)  Pulse: (!) 113 (06/19/22 1007)  Resp: (!) 26 (06/19/22 1007)  BP: 139/87 (06/19/22 1007)  SpO2: 99 % (06/19/22 1007)   Vital Signs (24h Range):  Temp:  [97.9 °F (36.6 °C)] 97.9 °F (36.6 °C)  Pulse:  [113-132] 113  Resp:  [26-42] 26  SpO2:  [93 %-99 %] 99 %  BP: (112-139)/(65-89) 139/87     Weight: 74.8 kg (165 lb)  Body mass index is 23.68 kg/m².    Physical Exam  Vitals and nursing note reviewed.   Constitutional:       General: He is in acute distress.      Appearance: He is ill-appearing. He is not toxic-appearing.   HENT:      Head: Normocephalic.      Right Ear: External ear normal.      Left Ear: External ear normal.      Mouth/Throat:      Mouth: Mucous membranes are moist.   Eyes:      General:         Right eye: No discharge.         Left eye: No discharge.      Conjunctiva/sclera: Conjunctivae normal.   Cardiovascular:      Rate and Rhythm: Regular rhythm. Tachycardia present.      Pulses: Normal pulses.      Heart sounds: Normal heart sounds. No murmur heard.  Pulmonary:      Breath sounds: Wheezing and rales present.   Chest:      Chest wall: No tenderness.   Abdominal:      General: Bowel sounds are normal. There is no distension.      Tenderness: There is no abdominal tenderness. There is no guarding.   Musculoskeletal:      Cervical back: Neck  supple.      Right lower leg: No edema.      Left lower leg: No edema.   Skin:     General: Skin is warm.      Findings: Lesion present.   Neurological:      Mental Status: He is unresponsive.           Significant Labs: All pertinent labs within the past 24 hours have been reviewed.    Significant Imaging: I have reviewed all pertinent imaging results/findings within the past 24 hours.    Assessment/Plan:     * Aspiration pneumonia  BCx2  Sputum culture pending  Chest xray showed: Patchy right basilar densities are nonspecific and may represent infectious/inflammatory change versus atelectasis.  Patient got a dose of rocephin in the ED  Vanc and Zosyn now- might consider ceftriaxone + metronidazole after vitals become stable  duoneb   Oxygen as needed     Sepsis  Patient met the criteria for sepsis on presentation  WBC of 14.19  Lactic acid was 3.4, second one pending   A bolus of Nacl 1 L was ordered  Nacl with rate of 200 cc/hr to be continued   Zosyn and Vanc  ABG pending          Elevated troponin    Troponin 79 and trending   EKG showed sinus tachycardia    Hx of Clostridium difficile infection    Patient had Oral Vancomycin in his historic medications     GERD (gastroesophageal reflux disease)  Protonix 40 IV daily        Hx of stroke associated with blood clotting tendency  Patient takes eliquis and ASA at the nursing home  Nursing home staff reported he is non verbal, has dementia/encephalopathy, dysphagia  Current state seems to be the baseline per [facility] staff  Holding these 2 medications for now until sudden AMS is fully r/o      DM2 (diabetes mellitus, type 2)  Patient takes Lantus 10 units nightly at home  A1c pending  SSI for now and will adjust accordingly     VTE Risk Mitigation (From admission, onward)         Ordered     IP VTE LOW RISK PATIENT  Once         06/19/22 1106     Place sequential compression device  Until discontinued         06/19/22 1106                   Reg Tran  MD  Department of Hospital Medicine   [facility] [place] Emergency Department

## 2022-06-19 NOTE — ASSESSMENT & PLAN NOTE
BCx2  Sputum culture pending  Chest xray showed: Patchy right basilar densities are nonspecific and may represent infectious/inflammatory change versus atelectasis.  Patient got a dose of rocephin in the ED  Vanc and [place] now- might consider ceftriaxone + metronidazole after vitals become stable  duoneb   Oxygen as needed

## 2022-06-19 NOTE — NURSING
1247: Rec'd pt from ER. Pt stable upon arrival. No distress indicated. Vitals stable. Safety precautions in place. CB within reach. Will cont plan of care.

## 2022-06-19 NOTE — ASSESSMENT & PLAN NOTE
Patient met the criteria for sepsis on presentation  WBC of 14.19 on admission, 8.44 today  Lactic acid was 3.4, second one was 0.7  Nacl with rate of 100 cc/hr to be continued   Zosyn and Vanc until bcx back

## 2022-06-19 NOTE — ED PROVIDER NOTES
Encounter Date: 6/19/2022       History     Chief Complaint   Patient presents with    Shortness of Breath     67-year-old male patient presents to emergency department via EMS from long-term care facility with reportedly possible aspirin pneumoniae.  Patient is unable to communicate due to previous CVA.  Old record reviewed.  He was noted he was admitted in December 2021 for aspirate pneumonia.  He does have a PEG tube in place.        Review of patient's allergies indicates:  No Known Allergies  Past Medical History:   Diagnosis Date    Coronary artery disease     Dementia     Depression     Diabetes mellitus     GERD (gastroesophageal reflux disease)     PVD (peripheral vascular disease)     Seizures     Stroke      Past Surgical History:   Procedure Laterality Date    BELOW KNEE AMPUTATION OF LOWER EXTREMITY Left     GASTROSTOMY TUBE PLACEMENT       History reviewed. No pertinent family history.  Social History     Tobacco Use    Smoking status: Unknown If Ever Smoked    Smokeless tobacco: Never Used   Substance Use Topics    Alcohol use: Not Currently    Drug use: Not Currently     Review of Systems   Unable to perform ROS: Patient nonverbal       Physical Exam     Initial Vitals   BP Pulse Resp Temp SpO2   06/19/22 0736 06/19/22 0736 06/19/22 0736 06/19/22 0752 06/19/22 0736   112/65 (!) 132 (!) 42 97.9 °F (36.6 °C) (!) 93 %      MAP       --                Physical Exam    Constitutional: He appears well-developed and well-nourished.   HENT:   Head: Normocephalic and atraumatic.   Neck: Neck supple. No JVD present.   Cardiovascular: Regular rhythm, normal heart sounds and intact distal pulses.   Pulmonary/Chest: No stridor. He has rhonchi.   Breath sounds very course.  Oral airway suction.  Upper airway sounds improved.  Rhonchi still noted throughout lung fields.   Abdominal: Abdomen is soft. Bowel sounds are normal. There is no abdominal tenderness.   PEG tube noted There is no guarding.    Musculoskeletal:      Cervical back: Neck supple.      Comments: Left thumb amputation noted at the the IP joint.  Left BKA noted.     Neurological:   Nonverbal.  Does not follow commands.   Skin: Skin is warm and dry. Capillary refill takes less than 2 seconds.         Medical Screening Exam   See Full Note    ED Course   Procedures  Labs Reviewed   COMPREHENSIVE METABOLIC PANEL - Abnormal; Notable for the following components:       Result Value    Sodium 153 (*)     Chloride 111 (*)     Glucose 306 (*)     BUN 53 (*)     BUN/Creatinine Ratio 41 (*)     Total Protein 9.0 (*)     Albumin 3.0 (*)     Globulin 6.0 (*)     Alk Phos 222 (*)     AST 53 (*)     All other components within normal limits   LACTIC ACID, PLASMA - Abnormal; Notable for the following components:    Lactic Acid 3.4 (*)     All other components within normal limits   URINALYSIS, REFLEX TO URINE CULTURE - Abnormal; Notable for the following components:    Clarity, UA Slightly Cloudy (*)     Leukocytes, UA Trace (*)     Protein, UA >=300 (*)     Blood, UA Moderate (*)     All other components within normal limits   TROPONIN I - Abnormal; Notable for the following components:    Troponin I High Sensitivity 79.8 (*)     All other components within normal limits   NT-PRO NATRIURETIC PEPTIDE - Abnormal; Notable for the following components:    ProBNP 267 (*)     All other components within normal limits   CBC WITH DIFFERENTIAL - Abnormal; Notable for the following components:    WBC 14.19 (*)     RBC 3.27 (*)     Hemoglobin 10.1 (*)     Hematocrit 32.1 (*)     MCV 98.2 (*)     MCHC 31.5 (*)     Neutrophils % 90.9 (*)     Lymphocytes % 4.5 (*)     Eosinophils % 0.0 (*)     Immature Granulocytes % 0.5 (*)     Neutrophils, Abs 12.91 (*)     Lymphocytes, Absolute 0.64 (*)     Immature Granulocytes, Absolute 0.07 (*)     All other components within normal limits   MANUAL DIFFERENTIAL - Abnormal; Notable for the following components:    Segmented  Neutrophils, Man % 91 (*)     Lymphocytes, Man % 4 (*)     Platelet Morphology Platelet Clumping (*)     All other components within normal limits   URINALYSIS, MICROSCOPIC - Abnormal; Notable for the following components:    RBC, UA 3-5 (*)     Bacteria, UA Moderate (*)     Squamous Epithelial Cells, UA Occasional (*)     Mucus, UA Moderate (*)     Hyaline Casts, UA 2-5 (*)     Calcium Oxalate Crystals, UA Rare (*)     All other components within normal limits   CULTURE, BLOOD   CULTURE, BLOOD   CULTURE, URINE   CBC W/ AUTO DIFFERENTIAL    Narrative:     The following orders were created for panel order CBC auto differential.  Procedure                               Abnormality         Status                     ---------                               -----------         ------                     CBC with Differential[[id number]]        Abnormal            Final result               Manual Differential[[id number]]          Abnormal            Final result                 Please view results for these tests on the individual orders.   LACTIC ACID, PLASMA   TROPONIN I   SARS-COV2 (COVID) W/ FLU ANTIGEN     EKG Readings: (Independently Interpreted)   Initial Reading: No STEMI. Rhythm: Sinus Tachycardia. Heart Rate: 123. Ectopy: No Ectopy. Conduction: Normal. ST Segments: Normal ST Segments. T Waves: Normal. Axis: Normal. Clinical Impression: Sinus Tachycardia     ECG Results          EKG 12-lead (In process)  Result time 06/19/22 08:07:17    In process by Interface, Lab In [facility] (06/19/22 08:07:17)                 Narrative:    Test Reason : A41.9,    Vent. Rate : 133 BPM     Atrial Rate : 000 BPM     P-R Int : 122 ms          QRS Dur : 068 ms      QT Int : 306 ms       P-R-T Axes : 070 070 050 degrees     QTc Int : 434 ms    Sinus tachycardia  Normal ECG except for rate      Referred By: [email or web address]   SELF           Confirmed By:                             Imaging Results          X-Ray Chest AP Portable (Final  result)  Result time 06/19/22 08:37:44    Final result by Roel Adamson MD (06/19/22 08:37:44)                 Impression:      Patchy right basilar densities are nonspecific and may represent infectious/inflammatory change versus atelectasis.      Electronically signed by: Roel Adamson  Date:    06/19/2022  Time:    08:37             Narrative:    EXAMINATION:  XR CHEST AP PORTABLE    CLINICAL HISTORY:  Sepsis;    TECHNIQUE:  Single frontal view of the chest was performed.    COMPARISON:  12/14/2021    FINDINGS:  The cardiac silhouette is within normal limits.  There are patchy right basilar densities.  There are left lung is clear.  No pneumothorax or large pleural effusion.                                 Medications   sodium chloride 0.9% bolus 2,244 mL (2,244 mLs Intravenous New Bag 6/19/22 0832)   cefTRIAXone (ROCEPHIN) 1 g in dextrose 5 % in water (D5W) 5 % 50 mL IVPB (MB+) (0 g Intravenous Stopped 6/19/22 0940)                 ED Course as of 06/19/22 1030   Sun Jun 19, 2022   0843 X-ray report reviewed.  X-ray film reviewed. [CM]   0852 Lab notified of troponin 79.8. [CM]   1029 Spoke with Dr. Spivey [CM]      ED Course User Index  [CM] MERLY Garcia          Clinical Impression:   Final diagnoses:  [A41.9] Sepsis (Primary)          ED Disposition Condition    Admit               MERLY Garcia  06/19/22 1030

## 2022-06-20 PROBLEM — I82.403 DEEP VEIN THROMBOSIS (DVT) OF BOTH LOWER EXTREMITIES: Status: ACTIVE | Noted: 2022-06-20

## 2022-06-20 LAB
ANION GAP SERPL CALCULATED.3IONS-SCNC: 12 MMOL/L (ref 7–16)
ANION GAP SERPL CALCULATED.3IONS-SCNC: 8 MMOL/L (ref 7–16)
APTT PPP: 183.9 SECONDS (ref 25.2–37.3)
APTT PPP: 49 SECONDS (ref 25.2–37.3)
APTT PPP: 52.9 SECONDS (ref 25.2–37.3)
BASOPHILS # BLD AUTO: 0.03 K/UL (ref 0–0.2)
BASOPHILS # BLD AUTO: 0.04 K/UL (ref 0–0.2)
BASOPHILS NFR BLD AUTO: 0.4 % (ref 0–1)
BASOPHILS NFR BLD AUTO: 0.5 % (ref 0–1)
BUN SERPL-MCNC: 43 MG/DL (ref 7–18)
BUN SERPL-MCNC: 48 MG/DL (ref 7–18)
BUN/CREAT SERPL: 52 (ref 6–20)
BUN/CREAT SERPL: 59 (ref 6–20)
CALCIUM SERPL-MCNC: 8.6 MG/DL (ref 8.5–10.1)
CALCIUM SERPL-MCNC: 8.9 MG/DL (ref 8.5–10.1)
CHLORIDE SERPL-SCNC: 115 MMOL/L (ref 98–107)
CHLORIDE SERPL-SCNC: 116 MMOL/L (ref 98–107)
CO2 SERPL-SCNC: 30 MMOL/L (ref 21–32)
CO2 SERPL-SCNC: 31 MMOL/L (ref 21–32)
CREAT SERPL-MCNC: 0.81 MG/DL (ref 0.7–1.3)
CREAT SERPL-MCNC: 0.83 MG/DL (ref 0.7–1.3)
DIFFERENTIAL METHOD BLD: ABNORMAL
DIFFERENTIAL METHOD BLD: ABNORMAL
EOSINOPHIL # BLD AUTO: 0.06 K/UL (ref 0–0.5)
EOSINOPHIL # BLD AUTO: 0.13 K/UL (ref 0–0.5)
EOSINOPHIL NFR BLD AUTO: 0.7 % (ref 1–4)
EOSINOPHIL NFR BLD AUTO: 1.7 % (ref 1–4)
ERYTHROCYTE [DISTWIDTH] IN BLOOD BY AUTOMATED COUNT: 13.9 % (ref 11.5–14.5)
ERYTHROCYTE [DISTWIDTH] IN BLOOD BY AUTOMATED COUNT: 14.2 % (ref 11.5–14.5)
GLUCOSE SERPL-MCNC: 129 MG/DL (ref 70–105)
GLUCOSE SERPL-MCNC: 148 MG/DL (ref 74–106)
GLUCOSE SERPL-MCNC: 151 MG/DL (ref 70–105)
GLUCOSE SERPL-MCNC: 154 MG/DL (ref 70–105)
GLUCOSE SERPL-MCNC: 158 MG/DL (ref 70–105)
GLUCOSE SERPL-MCNC: 168 MG/DL (ref 74–106)
GLUCOSE SERPL-MCNC: 171 MG/DL (ref 70–105)
HCT VFR BLD AUTO: 27.5 % (ref 40–54)
HCT VFR BLD AUTO: 29.5 % (ref 40–54)
HGB BLD-MCNC: 8.2 G/DL (ref 13.5–18)
HGB BLD-MCNC: 8.8 G/DL (ref 13.5–18)
IMM GRANULOCYTES # BLD AUTO: 0.01 K/UL (ref 0–0.04)
IMM GRANULOCYTES # BLD AUTO: 0.03 K/UL (ref 0–0.04)
IMM GRANULOCYTES NFR BLD: 0.1 % (ref 0–0.4)
IMM GRANULOCYTES NFR BLD: 0.4 % (ref 0–0.4)
INR BLD: 1.2 (ref 0.9–1.1)
LYMPHOCYTES # BLD AUTO: 1.37 K/UL (ref 1–4.8)
LYMPHOCYTES # BLD AUTO: 1.42 K/UL (ref 1–4.8)
LYMPHOCYTES NFR BLD AUTO: 16.8 % (ref 27–41)
LYMPHOCYTES NFR BLD AUTO: 18.1 % (ref 27–41)
MCH RBC QN AUTO: 30.3 PG (ref 27–31)
MCH RBC QN AUTO: 30.5 PG (ref 27–31)
MCHC RBC AUTO-ENTMCNC: 29.8 G/DL (ref 32–36)
MCHC RBC AUTO-ENTMCNC: 29.8 G/DL (ref 32–36)
MCV RBC AUTO: 101.7 FL (ref 80–96)
MCV RBC AUTO: 102.2 FL (ref 80–96)
MONOCYTES # BLD AUTO: 0.6 K/UL (ref 0–0.8)
MONOCYTES # BLD AUTO: 0.71 K/UL (ref 0–0.8)
MONOCYTES NFR BLD AUTO: 7.9 % (ref 2–6)
MONOCYTES NFR BLD AUTO: 8.4 % (ref 2–6)
MPC BLD CALC-MCNC: 12 FL (ref 9.4–12.4)
MPC BLD CALC-MCNC: 12.3 FL (ref 9.4–12.4)
NEUTROPHILS # BLD AUTO: 5.43 K/UL (ref 1.8–7.7)
NEUTROPHILS # BLD AUTO: 6.18 K/UL (ref 1.8–7.7)
NEUTROPHILS NFR BLD AUTO: 71.8 % (ref 53–65)
NEUTROPHILS NFR BLD AUTO: 73.2 % (ref 53–65)
NRBC # BLD AUTO: 0 X10E3/UL
NRBC # BLD AUTO: 0 X10E3/UL
NRBC, AUTO (.00): 0 %
NRBC, AUTO (.00): 0 %
PLATELET # BLD AUTO: 148 K/UL (ref 150–400)
PLATELET # BLD AUTO: 154 K/UL (ref 150–400)
POTASSIUM SERPL-SCNC: 3.8 MMOL/L (ref 3.5–5.1)
POTASSIUM SERPL-SCNC: 3.8 MMOL/L (ref 3.5–5.1)
PROTHROMBIN TIME: 14.7 SECONDS (ref 11.7–14.7)
RBC # BLD AUTO: 2.69 M/UL (ref 4.6–6.2)
RBC # BLD AUTO: 2.9 M/UL (ref 4.6–6.2)
SODIUM SERPL-SCNC: 150 MMOL/L (ref 136–145)
SODIUM SERPL-SCNC: 154 MMOL/L (ref 136–145)
WBC # BLD AUTO: 7.57 K/UL (ref 4.5–11)
WBC # BLD AUTO: 8.44 K/UL (ref 4.5–11)

## 2022-06-20 PROCEDURE — 11000001 HC ACUTE MED/SURG PRIVATE ROOM

## 2022-06-20 PROCEDURE — 63600175 PHARM REV CODE 636 W HCPCS

## 2022-06-20 PROCEDURE — 36415 COLL VENOUS BLD VENIPUNCTURE: CPT

## 2022-06-20 PROCEDURE — A4217 STERILE WATER/SALINE, 500 ML: HCPCS

## 2022-06-20 PROCEDURE — 63600175 PHARM REV CODE 636 W HCPCS: Performed by: FAMILY MEDICINE

## 2022-06-20 PROCEDURE — 99232 SBSQ HOSP IP/OBS MODERATE 35: CPT | Mod: GC,,, | Performed by: FAMILY MEDICINE

## 2022-06-20 PROCEDURE — 25000003 PHARM REV CODE 250

## 2022-06-20 PROCEDURE — 80048 BASIC METABOLIC PNL TOTAL CA: CPT

## 2022-06-20 PROCEDURE — 82962 GLUCOSE BLOOD TEST: CPT

## 2022-06-20 PROCEDURE — 85730 THROMBOPLASTIN TIME PARTIAL: CPT

## 2022-06-20 PROCEDURE — 85025 COMPLETE CBC W/AUTO DIFF WBC: CPT

## 2022-06-20 PROCEDURE — 27000620

## 2022-06-20 PROCEDURE — 27000221 HC OXYGEN, UP TO 24 HOURS

## 2022-06-20 PROCEDURE — 25000003 PHARM REV CODE 250: Performed by: FAMILY MEDICINE

## 2022-06-20 PROCEDURE — 94761 N-INVAS EAR/PLS OXIMETRY MLT: CPT

## 2022-06-20 PROCEDURE — 99232 PR SUBSEQUENT HOSPITAL CARE,LEVL II: ICD-10-PCS | Mod: GC,,, | Performed by: FAMILY MEDICINE

## 2022-06-20 PROCEDURE — 85730 THROMBOPLASTIN TIME PARTIAL: CPT | Performed by: FAMILY MEDICINE

## 2022-06-20 PROCEDURE — 94640 AIRWAY INHALATION TREATMENT: CPT

## 2022-06-20 PROCEDURE — C9113 INJ PANTOPRAZOLE SODIUM, VIA: HCPCS

## 2022-06-20 PROCEDURE — 25000242 PHARM REV CODE 250 ALT 637 W/ HCPCS

## 2022-06-20 PROCEDURE — 85610 PROTHROMBIN TIME: CPT

## 2022-06-20 PROCEDURE — A6212 FOAM DRG <=16 SQ IN W/BORDER: HCPCS

## 2022-06-20 RX ORDER — SODIUM CHLORIDE 450 MG/100ML
INJECTION, SOLUTION INTRAVENOUS CONTINUOUS
Status: DISCONTINUED | OUTPATIENT
Start: 2022-06-20 | End: 2022-06-20

## 2022-06-20 RX ORDER — HEPARIN SODIUM,PORCINE/D5W 25000/250
0-40 INTRAVENOUS SOLUTION INTRAVENOUS CONTINUOUS
Status: DISCONTINUED | OUTPATIENT
Start: 2022-06-20 | End: 2022-06-21

## 2022-06-20 RX ORDER — SODIUM CHLORIDE 0.45 G/100ML
SOLUTION INTRAVENOUS CONTINUOUS
Status: DISCONTINUED | OUTPATIENT
Start: 2022-06-20 | End: 2022-06-21

## 2022-06-20 RX ADMIN — ASPIRIN 81 MG: 81 TABLET, COATED ORAL at 09:06

## 2022-06-20 RX ADMIN — LEVETIRACETAM 1000 MG: 500 TABLET, FILM COATED ORAL at 09:06

## 2022-06-20 RX ADMIN — DONEPEZIL HYDROCHLORIDE 10 MG: 5 TABLET ORAL at 08:06

## 2022-06-20 RX ADMIN — VASOPRESSIN: 20 INJECTION, SOLUTION INTRAVENOUS at 04:06

## 2022-06-20 RX ADMIN — METOPROLOL TARTRATE 12.5 MG: 25 TABLET, FILM COATED ORAL at 09:06

## 2022-06-20 RX ADMIN — PIPERACILLIN AND TAZOBACTAM 4.5 G: 4; .5 INJECTION, POWDER, FOR SOLUTION INTRAVENOUS at 08:06

## 2022-06-20 RX ADMIN — IPRATROPIUM BROMIDE AND ALBUTEROL SULFATE 3 ML: .5; 3 SOLUTION RESPIRATORY (INHALATION) at 12:06

## 2022-06-20 RX ADMIN — PIPERACILLIN AND TAZOBACTAM 4.5 G: 4; .5 INJECTION, POWDER, FOR SOLUTION INTRAVENOUS at 11:06

## 2022-06-20 RX ADMIN — PHENYTOIN 200 MG: 125 SUSPENSION ORAL at 09:06

## 2022-06-20 RX ADMIN — IPRATROPIUM BROMIDE AND ALBUTEROL SULFATE 3 ML: .5; 3 SOLUTION RESPIRATORY (INHALATION) at 07:06

## 2022-06-20 RX ADMIN — LEVETIRACETAM 1000 MG: 500 TABLET, FILM COATED ORAL at 08:06

## 2022-06-20 RX ADMIN — HEPARIN SODIUM 18 UNITS/KG/HR: 10000 INJECTION, SOLUTION INTRAVENOUS at 07:06

## 2022-06-20 RX ADMIN — VANCOMYCIN HYDROCHLORIDE 1500 MG: 5 INJECTION, POWDER, LYOPHILIZED, FOR SOLUTION INTRAVENOUS at 04:06

## 2022-06-20 RX ADMIN — SERTRALINE HYDROCHLORIDE 100 MG: 50 TABLET ORAL at 09:06

## 2022-06-20 RX ADMIN — AMLODIPINE BESYLATE 5 MG: 5 TABLET ORAL at 09:06

## 2022-06-20 RX ADMIN — PIPERACILLIN AND TAZOBACTAM 4.5 G: 4; .5 INJECTION, POWDER, FOR SOLUTION INTRAVENOUS at 04:06

## 2022-06-20 RX ADMIN — PHENYTOIN 200 MG: 125 SUSPENSION ORAL at 08:06

## 2022-06-20 RX ADMIN — PANTOPRAZOLE SODIUM 40 MG: 40 INJECTION, POWDER, FOR SOLUTION INTRAVENOUS at 09:06

## 2022-06-20 RX ADMIN — METOPROLOL TARTRATE 12.5 MG: 25 TABLET, FILM COATED ORAL at 08:06

## 2022-06-20 RX ADMIN — ATORVASTATIN CALCIUM 80 MG: 80 TABLET, FILM COATED ORAL at 09:06

## 2022-06-20 RX ADMIN — SODIUM CHLORIDE: 4.5 INJECTION, SOLUTION INTRAVENOUS at 02:06

## 2022-06-20 RX ADMIN — POLYETHYLENE GLYCOL 3350 17 G: 17 POWDER, FOR SOLUTION ORAL at 09:06

## 2022-06-20 NOTE — SUBJECTIVE & OBJECTIVE
Past Medical History:   Diagnosis Date    Coronary artery disease     Dementia     Depression     Diabetes mellitus     GERD (gastroesophageal reflux disease)     PVD (peripheral vascular disease)     Seizures     Stroke        Past Surgical History:   Procedure Laterality Date    BELOW KNEE AMPUTATION OF LOWER EXTREMITY Left     GASTROSTOMY TUBE PLACEMENT         Review of patient's allergies indicates:  No Known Allergies    No current facility-administered medications on file prior to encounter.     Current Outpatient Medications on File Prior to Encounter   Medication Sig    albuterol (PROVENTIL) 2.5 mg /3 mL (0.083 %) nebulizer solution Take by nebulization.    albuterol-ipratropium (DUO-NEB) 2.5 mg-0.5 mg/3 mL nebulizer solution SMARTSIG:3 Milliliter(s) Via Nebulizer Every 6 Hours    aspirin (ECOTRIN) 81 MG EC tablet Take 81 mg by mouth once daily.    atorvastatin (LIPITOR) 80 MG tablet Take 80 mg by mouth once daily.    donepeziL (ARICEPT) 10 MG tablet Take 10 mg by mouth every evening.    ELIQUIS 5 mg Tab Take 5 mg by mouth 2 (two) times a day.    insulin glargine (LANTUS) 100 unit/mL injection Inject 10 Units into the skin every evening.    levETIRAcetam (KEPPRA) 1000 MG tablet Take 1,000 mg by mouth 2 (two) times daily.    metFORMIN (GLUCOPHAGE) 500 MG tablet Take 500 mg by mouth 2 (two) times daily with meals.    multivitamin (THERAGRAN) per tablet Take 1 tablet by mouth once daily.    omeprazole (PRILOSEC) 20 MG capsule Take 20 mg by mouth once daily.    phenytoin (DILANTIN) 125 mg/5 mL suspension Take 8 mLs by mouth 2 (two) times a day.    polyethylene glycol (GLYCOLAX) 17 gram PwPk Take 17 g by mouth once daily.    sertraline (ZOLOFT) 100 MG tablet Take 100 mg by mouth once daily.    acetaminophen (TYLENOL) 650 MG TbSR Take 650 mg by mouth every 4 (four) hours as needed.    amLODIPine (NORVASC) 5 MG tablet Take 5 mg by mouth once daily.    predniSONE (DELTASONE) 20 MG tablet Take 20 mg by mouth once  daily.    vancomycin (VANCOCIN) 125 MG capsule Take 125 mg by mouth 4 (four) times daily.     Family History    None       Tobacco Use    Smoking status: Unknown If Ever Smoked    Smokeless tobacco: Never Used   Substance and Sexual Activity    Alcohol use: Not Currently    Drug use: Not Currently    Sexual activity: Not Currently     Review of Systems   Reason unable to perform ROS: Unable to obtain, nonverbal and does not respond to yes/no questions.   Objective:     Vital Signs (Most Recent):  Temp: 98 °F (36.7 °C) (06/20/22 1553)  Pulse: 82 (06/20/22 1553)  Resp: 18 (06/20/22 1553)  BP: 121/81 (06/20/22 1553)  SpO2: 100 % (06/20/22 1715) Vital Signs (24h Range):  Temp:  [97.2 °F (36.2 °C)-98.8 °F (37.1 °C)] 98 °F (36.7 °C)  Pulse:  [76-94] 82  Resp:  [16-20] 18  SpO2:  [98 %-100 %] 100 %  BP: (107-132)/(67-81) 121/81     Weight: 74.8 kg (164 lb 14.5 oz)  Body mass index is 22.37 kg/m².    SpO2: 100 %  O2 Device (Oxygen Therapy): nasal cannula    No intake or output data in the 24 hours ending 06/20/22 1811    Lines/Drains/Airways       Peripheral Intravenous Line  Duration                  Peripheral IV - Single Lumen 06/19/22 0930 20 G Anterior;Distal;Left Forearm 1 day         Peripheral IV - Single Lumen 06/19/22 0930 24 G Left;Posterior Hand 1 day         Peripheral IV - Single Lumen 06/19/22 0945 22 G Right Wrist 1 day                    Physical Exam  Vitals reviewed.   Constitutional:       General: He is awake.      Appearance: He is ill-appearing.   HENT:      Mouth/Throat:      Mouth: Mucous membranes are dry.   Eyes:      General: No scleral icterus.  Cardiovascular:      Rate and Rhythm: Regular rhythm. Tachycardia present.      Heart sounds: Normal heart sounds.   Pulmonary:      Effort: Pulmonary effort is normal.      Breath sounds: Wheezing present.   Abdominal:      General: Bowel sounds are normal.      Palpations: Abdomen is soft.   Musculoskeletal:      Right lower leg: No edema.      Left  lower leg: No edema.   Skin:     General: Skin is warm and dry.      Coloration: Skin is not jaundiced.   Neurological:      Mental Status: He is unresponsive.      Comments: Awake, nonverbal and does not acknowledge yes/no questions       Significant Labs: ABG:   Recent Labs   Lab 06/19/22  1630   PH 7.44   PCO2 55*   HCO3 37.4*   POCSATURATED 96   , Blood Culture: No results for input(s): LABBLOO in the last 48 hours., BMP:   Recent Labs   Lab 06/19/22  0815 06/20/22  0330 06/20/22  1123   * 168* 148*   * 150* 154*   K 5.0 3.8 3.8   * 116* 115*   CO2 32 30 31   BUN 53* 48* 43*   CREATININE 1.28 0.81 0.83   CALCIUM 9.5 8.6 8.9   , CMP   Recent Labs   Lab 06/19/22  0815 06/20/22  0330 06/20/22  1123   * 150* 154*   K 5.0 3.8 3.8   * 116* 115*   CO2 32 30 31   * 168* 148*   BUN 53* 48* 43*   CREATININE 1.28 0.81 0.83   CALCIUM 9.5 8.6 8.9   PROT 9.0*  --   --    ALBUMIN 3.0*  --   --    BILITOT 0.2  --   --    ALKPHOS 222*  --   --    AST 53*  --   --    ALT 60  --   --    ANIONGAP 15 8 12   EGFRNONAA 60 101 98   , CBC   Recent Labs   Lab 06/19/22  1859 06/20/22  0330 06/20/22  1123   WBC 10.82 8.44 7.57   HGB 8.9* 8.2* 8.8*   HCT 30.5* 27.5* 29.5*    154 148*   , INR   Recent Labs   Lab 06/19/22  1859 06/20/22  1501   INR 1.23* 1.20*   , Lipid Panel No results for input(s): CHOL, HDL, LDLCALC, TRIG, CHOLHDL in the last 48 hours., and Troponin No results for input(s): TROPONINI in the last 48 hours.    Significant Imaging: Echocardiogram: Transthoracic echo (TTE) complete (Cupid Only):   Results for orders placed or performed during the hospital encounter of 06/19/22   Echo   Result Value Ref Range    BSA 1.95 m2    Right Atrial Pressure (from IVC) 3 mmHg    EF 55 %    Left Ventricular Outflow Tract Mean Gradient 1.00 mmHg    AORTIC VALVE CUSP SEPERATION [id number] cm    LVIDd 3.56 3.5 - 6.0 cm    IVS 1.39 (A) 0.6 - 1.1 cm    Posterior Wall 1.24 (A) 0.6 - 1.1 cm     Ao root annulus 3.10 cm    LVIDs 2.45 2.1 - 4.0 cm    FS 31 28 - 44 %    IVC ostium 0.9 cm    LV mass 160.39 g    LA size 2.80 cm    RVDD 2.50 cm    TAPSE 1.70 cm    Left Ventricle Relative Wall Thickness 0.70 cm    AV regurgitation pressure 1/2 time 890 ms    AV mean gradient 3 mmHg    AV valve area 2.05 cm2    AV Velocity Ratio 0.64     AV index (prosthetic) 0.59     E wave deceleration time 154 msec    LVOT diameter 2.10 cm    LVOT area 3.5 cm2    LVOT peak cal 0.7 m/s    LVOT peak VTI 13.00 cm    Ao peak cal 1.1 m/s    Ao VTI 22.00 cm    LVOT stroke volume 45.00 cm3    AV peak gradient 5 mmHg    MV Peak E Cal 0.51 m/s    AR Max Cal 2.42 m/s    LV Systolic Volume 21.20 mL    LV Systolic Volume Index 10.8 mL/m2    LV Diastolic Volume 53.00 mL    LV Diastolic Volume Index 27.04 mL/m2    LV Mass Index 82 g/m2    Echo EF Estimated 55 %    RA Major Axis 2.90 cm    and EKG: ST, rate 133 & 123, no ischemic changes

## 2022-06-20 NOTE — ASSESSMENT & PLAN NOTE
- Patient seen and evaluated by Dr. Randhawa  - Troponin 79, 132, 92  - EKGs ST with rates 133 & 122 and no ischemic changes  - Preliminary echo with normal EF, no WMA, normal RV size and normal CVP (3mmHg); Final results to follow  - Likely type II MI in setting of sepsis/dehydration/?PE with bilateral DVTs but no RV strain noted on echo   - Recommend continued medical management of acute illnesses, no further cardiac workup needed at this time  - Cardiology will sign off, please call if any further assistance is needed

## 2022-06-20 NOTE — SUBJECTIVE & OBJECTIVE
Interval History: Patient resting comfortably in bed. Seems a little more alert than yesterday. Potassium low so will replace through G tube today.  today so heparin stopped per protocol. Sputum culture grew gram positive cocci, bcx ng so far.     Unable to perform ROS: Dementia      Objective:     Vital Signs (Most Recent):  Temp: 98.8 °F (37.1 °C) (06/20/22 0830)  Pulse: 83 (06/20/22 0830)  Resp: 16 (06/20/22 0830)  BP: 131/75 (06/20/22 0830)  SpO2: 100 % (06/20/22 0945) Vital Signs (24h Range):  Temp:  [97.2 °F (36.2 °C)-98.8 °F (37.1 °C)] 98.8 °F (37.1 °C)  Pulse:  [] 83  Resp:  [16-24] 16  SpO2:  [96 %-100 %] 100 %  BP: (107-134)/(67-92) 131/75     Weight: 74.8 kg (164 lb 14.5 oz)  Body mass index is 22.37 kg/m².  No intake or output data in the 24 hours ending 06/20/22 1144   Physical Exam  Vitals and nursing note reviewed.   Constitutional:       General: He is not in acute distress.     Appearance: He is ill-appearing. He is not toxic-appearing.   HENT:      Head: Normocephalic.      Right Ear: External ear normal.      Left Ear: External ear normal.      Mouth/Throat:      Mouth: Mucous membranes are moist.   Eyes:      General:         Right eye: No discharge.         Left eye: No discharge.      Conjunctiva/sclera: Conjunctivae normal.   Cardiovascular:      Rate and Rhythm: Normal rate and regular rhythm.      Pulses: Normal pulses.      Heart sounds: Normal heart sounds. No murmur heard.  Pulmonary:      Breath sounds: No wheezing or rales.   Chest:      Chest wall: No tenderness.   Abdominal:      General: Bowel sounds are normal. There is no distension.      Tenderness: There is no abdominal tenderness. There is no guarding.   Musculoskeletal:      Cervical back: Neck supple.      Right lower leg: No edema.      Left lower leg: No edema.   Skin:     General: Skin is warm.      Findings: Lesion present.   Neurological:      Mental Status: Mental status is at baseline.       Significant  Labs: All pertinent labs within the past 24 hours have been reviewed.    Significant Imaging: I have reviewed all pertinent imaging results/findings within the past 24 hours.

## 2022-06-20 NOTE — CONSULTS
[address]  Cardiology  Consult Note    Patient Name: Mahesh Acuña  MRN: [id number]  Admission Date: 6/19/2022  Hospital Length of Stay: 1 days  Code Status: Full Code   Attending Provider: Umang De Leon Jr., MD   Consulting Provider: MERLY Reeder  Primary Care Physician: Primary Doctor No  Principal Problem:Aspiration pneumonia    Patient information was obtained from past medical records and ER records.     Inpatient consult to Cardiology  Consult performed by: MERLY Reeder  Consult ordered by: Reg Tran MD  Reason for consult: nstemi        Subjective:     Chief Complaint:  Sob, aspiration pneumonia      HPI:   66 y/o with PMH of CVA (nonverbal), Left BKA, PVD, encephalopathy, seizures, dysphagia with PEG tube, HTN, DM, came in from [place] due to SOB. Patient is non verbal and has encephalopathy therefore Hx was obtained from [place] via a phone conversation and limited Hx available in his chart. He was found having SOB, gastric content around and in his mouth with abnormal vital signs therefore was brought to the [facility] via EMS. Patient has PEG tube and Upon suctioning his mouth in the ED, PEG feeding content was noticed.      Patient has had a CVA that has left him with encephalopathy. He presented with AMS therefore there was concern regarding a possible stroke but [facility] reports that this is his normal state. Patient had a Aspiration pneumonia back in December 2021 which was treated at [facility].    Cardiology consulted for NSTEMI.      Past Medical History:   Diagnosis Date    Coronary artery disease     Dementia     Depression     Diabetes mellitus     GERD (gastroesophageal reflux disease)     PVD (peripheral vascular disease)     Seizures     Stroke        Past Surgical History:   Procedure Laterality Date    BELOW KNEE AMPUTATION OF LOWER EXTREMITY Left     GASTROSTOMY TUBE PLACEMENT         Review of patient's allergies indicates:  No  Known Allergies    No current facility-administered medications on file prior to encounter.     Current Outpatient Medications on File Prior to Encounter   Medication Sig    albuterol (PROVENTIL) 2.5 mg /3 mL (0.083 %) nebulizer solution Take by nebulization.    albuterol-ipratropium (DUO-NEB) 2.5 mg-0.5 mg/3 mL nebulizer solution SMARTSIG:3 Milliliter(s) Via Nebulizer Every 6 Hours    aspirin (ECOTRIN) 81 MG EC tablet Take 81 mg by mouth once daily.    atorvastatin (LIPITOR) 80 MG tablet Take 80 mg by mouth once daily.    donepeziL (ARICEPT) 10 MG tablet Take 10 mg by mouth every evening.    ELIQUIS 5 mg Tab Take 5 mg by mouth 2 (two) times a day.    insulin glargine (LANTUS) 100 unit/mL injection Inject 10 Units into the skin every evening.    levETIRAcetam (KEPPRA) 1000 MG tablet Take 1,000 mg by mouth 2 (two) times daily.    metFORMIN (GLUCOPHAGE) 500 MG tablet Take 500 mg by mouth 2 (two) times daily with meals.    multivitamin (THERAGRAN) per tablet Take 1 tablet by mouth once daily.    omeprazole (PRILOSEC) 20 MG capsule Take 20 mg by mouth once daily.    phenytoin (DILANTIN) 125 mg/5 mL suspension Take 8 mLs by mouth 2 (two) times a day.    polyethylene glycol (GLYCOLAX) 17 gram PwPk Take 17 g by mouth once daily.    sertraline (ZOLOFT) 100 MG tablet Take 100 mg by mouth once daily.    acetaminophen (TYLENOL) 650 MG TbSR Take 650 mg by mouth every 4 (four) hours as needed.    amLODIPine (NORVASC) 5 MG tablet Take 5 mg by mouth once daily.    predniSONE (DELTASONE) 20 MG tablet Take 20 mg by mouth once daily.    vancomycin (VANCOCIN) 125 MG capsule Take 125 mg by mouth 4 (four) times daily.     Family History    None       Tobacco Use    Smoking status: Unknown If Ever Smoked    Smokeless tobacco: Never Used   Substance and Sexual Activity    Alcohol use: Not Currently    Drug use: Not Currently    Sexual activity: Not Currently     Review of Systems   Reason unable to perform ROS:  Unable to obtain, nonverbal and does not respond to yes/no questions.   Objective:     Vital Signs (Most Recent):  Temp: 98 °F (36.7 °C) (06/20/22 1553)  Pulse: 82 (06/20/22 1553)  Resp: 18 (06/20/22 1553)  BP: 121/81 (06/20/22 1553)  SpO2: 100 % (06/20/22 1715) Vital Signs (24h Range):  Temp:  [97.2 °F (36.2 °C)-98.8 °F (37.1 °C)] 98 °F (36.7 °C)  Pulse:  [76-94] 82  Resp:  [16-20] 18  SpO2:  [98 %-100 %] 100 %  BP: (107-132)/(67-81) 121/81     Weight: 74.8 kg (164 lb 14.5 oz)  Body mass index is 22.37 kg/m².    SpO2: 100 %  O2 Device (Oxygen Therapy): nasal cannula    No intake or output data in the 24 hours ending 06/20/22 1811    Lines/Drains/Airways       Peripheral Intravenous Line  Duration                  Peripheral IV - Single Lumen 06/19/22 0930 20 G Anterior;Distal;Left Forearm 1 day         Peripheral IV - Single Lumen 06/19/22 0930 24 G Left;Posterior Hand 1 day         Peripheral IV - Single Lumen 06/19/22 0945 22 G Right Wrist 1 day                    Physical Exam  Vitals reviewed.   Constitutional:       General: He is awake.      Appearance: He is ill-appearing.   HENT:      Mouth/Throat:      Mouth: Mucous membranes are dry.   Eyes:      General: No scleral icterus.  Cardiovascular:      Rate and Rhythm: Regular rhythm. Tachycardia present.      Heart sounds: Normal heart sounds.   Pulmonary:      Effort: Pulmonary effort is normal.      Breath sounds: Wheezing present.   Abdominal:      General: Bowel sounds are normal.      Palpations: Abdomen is soft.   Musculoskeletal:      Right lower leg: No edema.      Left lower leg: No edema.   Skin:     General: Skin is warm and dry.      Coloration: Skin is not jaundiced.   Neurological:      Mental Status: He is unresponsive.      Comments: Awake, nonverbal and does not acknowledge yes/no questions       Significant Labs: ABG:   Recent Labs   Lab 06/19/22  1630   PH 7.44   PCO2 55*   HCO3 37.4*   POCSATURATED 96   , Blood Culture: No results for  input(s): LABBLOO in the last 48 hours., BMP:   Recent Labs   Lab 06/19/22  0815 06/20/22  0330 06/20/22  1123   * 168* 148*   * 150* 154*   K 5.0 3.8 3.8   * 116* 115*   CO2 32 30 31   BUN 53* 48* 43*   CREATININE 1.28 0.81 0.83   CALCIUM 9.5 8.6 8.9   , CMP   Recent Labs   Lab 06/19/22  0815 06/20/22  0330 06/20/22  1123   * 150* 154*   K 5.0 3.8 3.8   * 116* 115*   CO2 32 30 31   * 168* 148*   BUN 53* 48* 43*   CREATININE 1.28 0.81 0.83   CALCIUM 9.5 8.6 8.9   PROT 9.0*  --   --    ALBUMIN 3.0*  --   --    BILITOT 0.2  --   --    ALKPHOS 222*  --   --    AST 53*  --   --    ALT 60  --   --    ANIONGAP 15 8 12   EGFRNONAA 60 101 98   , CBC   Recent Labs   Lab 06/19/22  1859 06/20/22  0330 06/20/22  1123   WBC 10.82 8.44 7.57   HGB 8.9* 8.2* 8.8*   HCT 30.5* 27.5* 29.5*    154 148*   , INR   Recent Labs   Lab 06/19/22  1859 06/20/22  1501   INR 1.23* 1.20*   , Lipid Panel No results for input(s): CHOL, HDL, LDLCALC, TRIG, CHOLHDL in the last 48 hours., and Troponin No results for input(s): TROPONINI in the last 48 hours.    Significant Imaging: Echocardiogram: Transthoracic echo (TTE) complete (Cupid Only):   Results for orders placed or performed during the hospital encounter of 06/19/22   Echo   Result Value Ref Range    BSA 1.95 m2    Right Atrial Pressure (from IVC) 3 mmHg    EF 55 %    Left Ventricular Outflow Tract Mean Gradient 1.00 mmHg    AORTIC VALVE CUSP SEPERATION 20.278186747429197 cm    LVIDd 3.56 3.5 - 6.0 cm    IVS 1.39 (A) 0.6 - 1.1 cm    Posterior Wall 1.24 (A) 0.6 - 1.1 cm    Ao root annulus 3.10 cm    LVIDs 2.45 2.1 - 4.0 cm    FS 31 28 - 44 %    IVC ostium 0.9 cm    LV mass 160.39 g    LA size 2.80 cm    RVDD 2.50 cm    TAPSE 1.70 cm    Left Ventricle Relative Wall Thickness 0.70 cm    AV regurgitation pressure 1/2 time 890 ms    AV mean gradient 3 mmHg    AV valve area 2.05 cm2    AV Velocity Ratio 0.64     AV index (prosthetic) 0.59     E wave  deceleration time 154 msec    LVOT diameter 2.10 cm    LVOT area 3.5 cm2    LVOT peak cal 0.7 m/s    LVOT peak VTI 13.00 cm    Ao peak cal 1.1 m/s    Ao VTI 22.00 cm    LVOT stroke volume 45.00 cm3    AV peak gradient 5 mmHg    MV Peak E Cal 0.51 m/s    AR Max Cal 2.42 m/s    LV Systolic Volume 21.20 mL    LV Systolic Volume Index 10.8 mL/m2    LV Diastolic Volume 53.00 mL    LV Diastolic Volume Index 27.04 mL/m2    LV Mass Index 82 g/m2    Echo EF Estimated 55 %    RA Major Axis 2.90 cm    and EKG: ST, rate 133 & 123, no ischemic changes    Assessment and Plan:     * Aspiration pneumonia  - Being followed by primary medical team    Deep vein thrombosis (DVT) of both lower extremities  - On IV heparin  - Being followed by primary medical team    Elevated troponin  - Patient seen and evaluated by Dr. Randhawa  - Troponin 79, 132, 92  - EKGs ST with rates 133 & 122 and no ischemic changes  - Preliminary echo with normal EF, no WMA, normal RV size and normal CVP (3mmHg); Final results to follow  - Likely type II MI in setting of sepsis/dehydration/?PE with bilateral DVTs but no RV strain noted on echo   - Recommend continued medical management of acute illnesses, no further cardiac workup needed at this time  - Cardiology will sign off, please call if any further assistance is needed    Hx of stroke associated with blood clotting tendency  - Punctate 9 mm acute ischemic infarct within the left basal ganglia per MRI 4/2020  - Awake, but nonverbal and does not respond to yes/no questions      VTE Risk Mitigation (From admission, onward)         Ordered     heparin 25,000 units in dextrose 5% 250 mL (100 units/mL) infusion HIGH INTENSITY nomogram - [facility]  Continuous        Question:  Begin at (in units/kg/hr)  Answer:  18 06/20/22 1424     heparin 25,000 units in dextrose 5% (100 units/ml) IV bolus from bag - ADDITIONAL PRN BOLUS - 60 units/kg  As needed (PRN)        Question:  Heparin Infusion Adjustment (DO NOT MODIFY  ANSWER)  Answer:  [email or web address]\Images\Pharmacy\HeparinInfusions\heparin HIGH INTENSITY nomogram for [place] [id number]    06/20/22 1424     heparin 25,000 units in dextrose 5% (100 units/ml) IV bolus from bag - ADDITIONAL PRN BOLUS - 30 units/kg  As needed (PRN)        Question:  Heparin Infusion Adjustment (DO NOT MODIFY ANSWER)  Answer:  \\[email or web address]\epic\Images\Pharmacy\HeparinInfusions\heparin HIGH INTENSITY nomogram for [place] [id number].pdf    06/20/22 1424     IP VTE LOW RISK PATIENT  Once         06/19/22 1106     Place sequential compression device  Until discontinued         06/19/22 1106            Pt seen and examined, chart reviewed, essentially agree with above    CC: Decreased level of consciousness, aspiration  HPI: Pt is non-verbal, hx obtained from caregivers at ECG.  PE: agree with above  Labs, Xrays, EKGS reviewed    IMP/Plan:    1. Elevated troponin consistent with type II MI, due to demand perfusion mismatch.  Pt is not a candidate for invasive strategy, available if can help, although not much to add, will sign off, please recontact if can help.    Thank you for your consult. I will sign off. Please contact us if you have any additional questions.    Mel Cook, JORGEP  Cardiology   [facility] - [facility]

## 2022-06-20 NOTE — PLAN OF CARE
[facility] Telemetry  Initial Discharge Assessment       Primary Care Provider: Primary Doctor No    Admission Diagnosis: Chest pain [R07.9]  Sepsis [A41.9]    Admission Date: 6/19/2022  Expected Discharge Date:          Payor: MEDICARE / Plan: MEDICARE PART A & B / Product Type: Government /     Extended Emergency Contact Information  Primary Emergency Contact: Acuña Bassem  Home Phone: [phone]  Relation: Sister  Preferred language: English   needed? No    Discharge Plan A: Skilled Nursing Facility  Discharge Plan B: Long-term acute care facility (LTAC)    No Pharmacies Listed    Initial Assessment (most recent)     Adult Discharge Assessment - 06/20/22 0944        Discharge Assessment    Assessment Type Discharge Planning Assessment     Source of Information family     Lives With facility resident     Facility Arrived From: the [facility]     Do you expect to return to your current living situation? Yes     Equipment Currently Used at Home --   per nh    Do you currently have service(s) that help you manage your care at home? No     Discharge Plan A Skilled Nursing Facility     Discharge Plan B Long-term acute care facility (LTAC)     DME Needed Upon Discharge  none     Discharge Plan discussed with: --   bassem TALAVERA            consult for dc planning, spoke with pts ILAN acuña who is only family listed, pt resides at the [facility] and choice to return, confirmed that pt is resident and can return, pt may benefit from ltac, choice from family for specialty if needed, packet started, will keep family informed of dc needs

## 2022-06-20 NOTE — PLAN OF CARE
Problem: Gas Exchange Impaired  Goal: Optimal Gas Exchange  Outcome: Ongoing, Progressing      Detail Level: Detailed Quality 130: Documentation Of Current Medications In The Medical Record: Current Medications Documented Quality 131: Pain Assessment And Follow-Up: Pain assessment using a standardized tool is documented as negative, no follow-up plan required Quality 110: Preventive Care And Screening: Influenza Immunization: Influenza Immunization not Administered because Patient Refused.

## 2022-06-20 NOTE — HOSPITAL COURSE
6/20- Admitted yesterday from the [facility] with altered mental status.  He is started on empiric antibiotics.  Found to have clots in both legs. On heparin drip. Patient resting in bed with stable vital signs. PTT was elevated at 183.9 so heparin was stopped. Repeat lab is pending. Patient's troponin has trended downward to 92.2 from 103.3. US doppler of LE showed bilateral DVTs. Patient's Bcx are pending, will keep on empiric IV antibiotics for aspiration pneumonia for now.  He is being transferred to [facility] for further care. He will be on my service.

## 2022-06-20 NOTE — ASSESSMENT & PLAN NOTE
- Punctate 9 mm acute ischemic infarct within the left basal ganglia per MRI 4/2020  - Awake, but nonverbal and does not respond to yes/no questions

## 2022-06-20 NOTE — HPI
66 y/o with PMH of CVA (nonverbal), Left BKA, PVD, encephalopathy, seizures, dysphagia with PEG tube, HTN, DM, came in from [place] due to SOB. Patient is non verbal and has encephalopathy therefore Hx was obtained from [place] via a phone conversation and limited Hx available in his chart. He was found having SOB, gastric content around and in his mouth with abnormal vital signs therefore was brought to the [facility] via EMS. Patient has PEG tube and Upon suctioning his mouth in the ED, PEG feeding content was noticed.      Patient has had a CVA that has left him with encephalopathy. He presented with AMS therefore there was concern regarding a possible stroke but [place] reports that this is his normal state. Patient had a Aspiration pneumonia back in December 2021 which was treated at [facility].    Cardiology consulted for NSTEMI.

## 2022-06-20 NOTE — ASSESSMENT & PLAN NOTE
Patient takes eliquis and ASA at the nursing home  Nursing home staff reported he is non verbal, has dementia/encephalopathy, dysphagia  Current state seems to be the baseline per [facility] staff  Holding these 2 medications for now until sudden AMS is fully r/o  Heparin drip for now

## 2022-06-20 NOTE — PLAN OF CARE
Problem: Adult Inpatient Plan of Care  Goal: Plan of Care Review  Outcome: Ongoing, Progressing  Goal: Patient-Specific Goal (Individualized)  Outcome: Ongoing, Progressing  Goal: Absence of Hospital-Acquired Illness or Injury  Outcome: Ongoing, Progressing  Goal: Optimal Comfort and Wellbeing  Outcome: Ongoing, Progressing  Goal: Readiness for Transition of Care  Outcome: Ongoing, Progressing     Problem: Diabetes Comorbidity  Goal: Blood Glucose Level Within Targeted Range  Outcome: Ongoing, Progressing     Problem: Adjustment to Illness (Sepsis/Septic Shock)  Goal: Optimal Coping  Outcome: Ongoing, Progressing     Problem: Bleeding (Sepsis/Septic Shock)  Goal: Absence of Bleeding  Outcome: Ongoing, Progressing     Problem: Glycemic Control Impaired (Sepsis/Septic Shock)  Goal: Blood Glucose Level Within Desired Range  Outcome: Ongoing, Progressing     Problem: Infection Progression (Sepsis/Septic Shock)  Goal: Absence of Infection Signs and Symptoms  Outcome: Ongoing, Progressing     Problem: Nutrition Impaired (Sepsis/Septic Shock)  Goal: Optimal Nutrition Intake  Outcome: Ongoing, Progressing     Problem: Skin Injury Risk Increased  Goal: Skin Health and Integrity  Outcome: Ongoing, Progressing     Problem: Fall Injury Risk  Goal: Absence of Fall and Fall-Related Injury  Outcome: Ongoing, Progressing     Problem: Impaired Wound Healing  Goal: Optimal Wound Healing  Outcome: Ongoing, Progressing     Problem: Gas Exchange Impaired  Goal: Optimal Gas Exchange  Outcome: Ongoing, Progressing

## 2022-06-20 NOTE — PLAN OF CARE
Problem: Adult Inpatient Plan of Care  Goal: Plan of Care Review  Outcome: Ongoing, Progressing  Flowsheets (Taken 6/20/2022 0241)  Plan of Care Reviewed With: patient  Goal: Patient-Specific Goal (Individualized)  Outcome: Ongoing, Progressing  Flowsheets (Taken 6/20/2022 0241)  Anxieties, Fears or Concerns: Unable to assess  Individualized Care Needs: Monitor lung sounds, give meds via peg  Patient-Specific Goals (Include Timeframe): Discharge back to [facility]  Goal: Absence of Hospital-Acquired Illness or Injury  Outcome: Ongoing, Progressing  Goal: Optimal Comfort and Wellbeing  Outcome: Ongoing, Progressing  Goal: Readiness for Transition of Care  Outcome: Ongoing, Progressing     Problem: Diabetes Comorbidity  Goal: Blood Glucose Level Within Targeted Range  Outcome: Ongoing, Progressing     Problem: Adjustment to Illness (Sepsis/Septic Shock)  Goal: Optimal Coping  Outcome: Ongoing, Progressing     Problem: Bleeding (Sepsis/Septic Shock)  Goal: Absence of Bleeding  Outcome: Ongoing, Progressing     Problem: Glycemic Control Impaired (Sepsis/Septic Shock)  Goal: Blood Glucose Level Within Desired Range  Outcome: Ongoing, Progressing     Problem: Infection Progression (Sepsis/Septic Shock)  Goal: Absence of Infection Signs and Symptoms  Outcome: Ongoing, Progressing     Problem: Nutrition Impaired (Sepsis/Septic Shock)  Goal: Optimal Nutrition Intake  Outcome: Ongoing, Progressing     Problem: Skin Injury Risk Increased  Goal: Skin Health and Integrity  Outcome: Ongoing, Progressing     Problem: Fall Injury Risk  Goal: Absence of Fall and Fall-Related Injury  Outcome: Ongoing, Progressing   POC ongoing

## 2022-06-20 NOTE — PROGRESS NOTES
[address] Medicine  Progress Note    Patient Name: Mahesh Acuña  MRN: [id number]  Patient Class: IP- Inpatient   Admission Date: 6/19/2022  Length of Stay: 1 days  Attending Physician: Umang De Leon Jr., MD  Primary Care Provider: Primary Doctor No        Subjective:     Principal Problem:Aspiration pneumonia        HPI:  67 year old with PMHx of CVA, Left BKA, encephalopathy, dysphagia HTN, came in from [place] due to SOB. Patient is non verbal and has encephalopathy therefore Hx was obtained from [place] personal via a phone conversation and limited Hx available in his chart. He was found having SOB, gastric content around.in his mouth and abnormal vital signs therefore was brought to the [facility] via EMS. Patient has PEG tube and Upon suctioning his mouth in the ED, PEG feeding content was noticed.     Patient has had a CVA that has left him with encephalopathy. He presented with AMS therefore there was concern regarding a possible stroke but [place] reports that this is his normal state. Patient had a Aspiration pneumonia back in December 2021 which was treated in [facility] then patient also had a PEG. Patient has DM2 and takes Lantus 10 units nightly. He has hemrrhoids. Per chart he has peripheral vascular disease and takes atorvastatin. He has Hx of seizure and per charts e takes both Keppra and Phenytoin. Other PMHx includes GERD and MDD.     ED course:  Vitals: Patient meets septic criteria with pulse of 132, RR 42 bp 112/65 temp 97.9 and a known source of infection  Chest xray: Patchy right basilar densities are nonspecific and may represent infectious/inflammatory change versus atelectasis.  CBC: WBC 14.19 Hgb of 10.1  BMP: sodium of 153, glucose of 306, alk phosph 222   Lactic acid was 3.4- second take is pending  Troponin was 78.8 and trending  Probnp 267      Overview/Hospital Course:  No notes on file    Interval History: Patient resting in bed with stable  vital signs. PTT was elevated at 183.9 so heparin was stopped. Repeat lab is pending. Patient's troponin has trended downward to 92.2 from 103.3. US doppler of LE showed bilateral DVTs. Patient's Bcx are pending, will keep on empiric IV antibiotics for aspiration pneumonia for now.     Review of Systems   Unable to perform ROS: Dementia   Objective:     Vital Signs (Most Recent):  Temp: 97.5 °F (36.4 °C) (06/20/22 0455)  Pulse: 86 (06/20/22 0738)  Resp: 18 (06/20/22 0738)  BP: 110/68 (06/20/22 0455)  SpO2: 100 % (06/20/22 0738) Vital Signs (24h Range):  Temp:  [97.2 °F (36.2 °C)-98.8 °F (37.1 °C)] 97.5 °F (36.4 °C)  Pulse:  [] 86  Resp:  [18-32] 18  SpO2:  [94 %-100 %] 100 %  BP: (107-143)/(67-92) 110/68     Weight: 74.8 kg (164 lb 14.5 oz)  Body mass index is 22.37 kg/m².    Intake/Output Summary (Last 24 hours) at 6/20/2022 0758  Last data filed at 6/19/2022 1122  Gross per 24 hour   Intake 2294 ml   Output --   Net 2294 ml      Physical Exam  Vitals and nursing note reviewed.   Constitutional:       General: He is not in acute distress.     Appearance: He is ill-appearing. He is not toxic-appearing.   HENT:      Head: Normocephalic.      Right Ear: External ear normal.      Left Ear: External ear normal.      Mouth/Throat:      Mouth: Mucous membranes are moist.   Eyes:      General:         Right eye: No discharge.         Left eye: No discharge.      Conjunctiva/sclera: Conjunctivae normal.   Cardiovascular:      Rate and Rhythm: Normal rate and regular rhythm.      Pulses: Normal pulses.      Heart sounds: Normal heart sounds. No murmur heard.  Pulmonary:      Breath sounds: Wheezing and rales present.   Chest:      Chest wall: No tenderness.   Abdominal:      General: Bowel sounds are normal. There is no distension.      Tenderness: There is no abdominal tenderness. There is no guarding.   Musculoskeletal:      Cervical back: Neck supple.      Right lower leg: No edema.      Left lower leg: No edema.    Skin:     General: Skin is warm.      Findings: Lesion present.   Neurological:      Mental Status: He is unresponsive.       Significant Labs: All pertinent labs within the past 24 hours have been reviewed.    Significant Imaging: I have reviewed all pertinent imaging results/findings within the past 24 hours.      Assessment/Plan:      * Aspiration pneumonia  BCx2 pending  Sputum culture pending  Chest xray showed: Patchy right basilar densities are nonspecific and may represent infectious/inflammatory change versus atelectasis.  Patient got a dose of rocephin in the ED  Vanc and Zosyn until bcx back  duonebs q6  Oxygen as needed     Elevated troponin     Troponin 79 --> 103.3 --> 92.2  EKG showed sinus tachycardia    Hx of Clostridium difficile infection    Patient had Oral Vancomycin in his historic medications     GERD (gastroesophageal reflux disease)  Protonix 40 IV daily        Hx of stroke associated with blood clotting tendency  Patient takes eliquis and ASA at the nursing home  Nursing home staff reported he is non verbal, has dementia/encephalopathy, dysphagia  Current state seems to be the baseline per [facility] staff  Holding these 2 medications for now until sudden AMS is fully r/o  Heparin drip for now      DM2 (diabetes mellitus, type 2)  Patient takes Lantus 10 units nightly at home  A1c 6.1  SSI for now and will adjust accordingly     Sepsis  Patient met the criteria for sepsis on presentation  WBC of 14.19 on admission, 8.44 today  Lactic acid was 3.4, second one was 0.7  Nacl with rate of 100 cc/hr to be continued   Zosyn and Vanc until bcx back              VTE Risk Mitigation (From admission, onward)         Ordered     heparin 25,000 units in dextrose 5% 250 mL (100 units/mL) infusion LOW INTENSITY nomogram - [facility]  Continuous        Question:  Begin at (in units/kg/hr)  Answer:  12 06/19/22 1502     heparin 25,000 units in dextrose 5% (100 units/ml) IV bolus from bag INITIAL BOLUS (max bolus  4000 units)  As needed (PRN)        Question:  Heparin Infusion Adjustment (DO NOT MODIFY ANSWER)  Answer:  \\[email or web address]\epic\Images\Pharmacy\HeparinInfusions\heparin LOW INTENSITY nomogram for [facility] [id number].pdf    06/19/22 1502     heparin 25,000 units in dextrose 5% (100 units/ml) IV bolus from bag - ADDITIONAL PRN BOLUS - 30 units/kg (max bolus 4000 units)  As needed (PRN)        Question:  Heparin Infusion Adjustment (DO NOT MODIFY ANSWER)  Answer:  \\[email or web address]\epic\Images\Pharmacy\HeparinInfusions\heparin LOW INTENSITY nomogram for [facility] [id number].pdf    06/19/22 1502     IP VTE LOW RISK PATIENT  Once         06/19/22 1106     Place sequential compression device  Until discontinued         06/19/22 1106                Discharge Planning   BYRON:      Code Status: Full Code   Is the patient medically ready for discharge?:     Reason for patient still in hospital (select all that apply): Treatment  Discharge Plan A: Skilled Nursing Facility                  Deborah Chand MD  Department of Hospital Medicine   [address]

## 2022-06-20 NOTE — ASSESSMENT & PLAN NOTE
BCx2 pending  Sputum culture pending  Chest xray showed: Patchy right basilar densities are nonspecific and may represent infectious/inflammatory change versus atelectasis.  Patient got a dose of rocephin in the ED  Vanc and Zosyn until bcx back  duonebs q6  Oxygen as needed

## 2022-06-21 ENCOUNTER — HOSPITAL ENCOUNTER (INPATIENT)
Facility: HOSPITAL | Age: 67
LOS: 6 days | Discharge: NURSING, REHAB OR LONG-TERM CARE | DRG: 177 | End: 2022-06-27
Attending: FAMILY MEDICINE | Admitting: FAMILY MEDICINE
Payer: MEDICARE

## 2022-06-21 VITALS
HEART RATE: 84 BPM | TEMPERATURE: 98 F | OXYGEN SATURATION: 100 % | HEIGHT: 72 IN | RESPIRATION RATE: 18 BRPM | DIASTOLIC BLOOD PRESSURE: 66 MMHG | WEIGHT: 164.88 LBS | BODY MASS INDEX: 22.33 KG/M2 | SYSTOLIC BLOOD PRESSURE: 105 MMHG

## 2022-06-21 DIAGNOSIS — R13.10 DYSPHAGIA, UNSPECIFIED TYPE: Primary | ICD-10-CM

## 2022-06-21 DIAGNOSIS — L89.616 PRESSURE INJURY OF DEEP TISSUE OF RIGHT HEEL: ICD-10-CM

## 2022-06-21 DIAGNOSIS — L89.893 PRESSURE INJURY OF LEFT LEG, STAGE 3: ICD-10-CM

## 2022-06-21 DIAGNOSIS — L89.896 PRESSURE INJURY OF DEEP TISSUE OF RIGHT FOOT: ICD-10-CM

## 2022-06-21 DIAGNOSIS — J69.0 ASPIRATION PNEUMONIA: ICD-10-CM

## 2022-06-21 DIAGNOSIS — J69.0 ASPIRATION PNEUMONIA OF BOTH LOWER LOBES DUE TO REGURGITATED FOOD: ICD-10-CM

## 2022-06-21 DIAGNOSIS — L89.156 PRESSURE INJURY OF DEEP TISSUE OF SACRAL REGION: ICD-10-CM

## 2022-06-21 DIAGNOSIS — J69.0 ASPIRATION PNEUMONIA, UNSPECIFIED ASPIRATION PNEUMONIA TYPE, UNSPECIFIED LATERALITY, UNSPECIFIED PART OF LUNG: ICD-10-CM

## 2022-06-21 LAB
ANION GAP SERPL CALCULATED.3IONS-SCNC: 8 MMOL/L (ref 7–16)
AORTIC ROOT ANNULUS: 3.1 CM
AORTIC VALVE CUSP SEPERATION: 20.9 CM
APTT PPP: 112 SECONDS (ref 25.2–37.3)
APTT PPP: 187.4 SECONDS (ref 25.2–37.3)
APTT PPP: 198.5 SECONDS (ref 25.2–37.3)
AV INDEX (PROSTH): 0.59
AV MEAN GRADIENT: 3 MMHG
AV PEAK GRADIENT: 5 MMHG
AV REGURGITATION PRESSURE HALF TIME: 890 MS
AV VALVE AREA: 2.05 CM2
AV VELOCITY RATIO: 0.64
BASOPHILS # BLD AUTO: 0.05 K/UL (ref 0–0.2)
BASOPHILS NFR BLD AUTO: 0.7 % (ref 0–1)
BSA FOR ECHO PROCEDURE: 1.95 M2
BUN SERPL-MCNC: 33 MG/DL (ref 7–18)
BUN/CREAT SERPL: 51 (ref 6–20)
CALCIUM SERPL-MCNC: 8.8 MG/DL (ref 8.5–10.1)
CHLORIDE SERPL-SCNC: 113 MMOL/L (ref 98–107)
CO2 SERPL-SCNC: 31 MMOL/L (ref 21–32)
CREAT SERPL-MCNC: 0.65 MG/DL (ref 0.7–1.3)
CV ECHO LV RWT: 0.7 CM
DIFFERENTIAL METHOD BLD: ABNORMAL
DOP CALC AO PEAK VEL: 1.1 M/S
DOP CALC AO VTI: 22 CM
DOP CALC LVOT AREA: 3.5 CM2
DOP CALC LVOT DIAMETER: 2.1 CM
DOP CALC LVOT PEAK VEL: 0.7 M/S
DOP CALC LVOT STROKE VOLUME: 45 CM3
DOP CALCLVOT PEAK VEL VTI: 13 CM
E WAVE DECELERATION TIME: 154 MSEC
ECHO EF ESTIMATED: 55 %
ECHO LV POSTERIOR WALL: 1.24 CM (ref 0.6–1.1)
EJECTION FRACTION: 55 %
EOSINOPHIL # BLD AUTO: 0.15 K/UL (ref 0–0.5)
EOSINOPHIL NFR BLD AUTO: 2.1 % (ref 1–4)
ERYTHROCYTE [DISTWIDTH] IN BLOOD BY AUTOMATED COUNT: 13.8 % (ref 11.5–14.5)
FRACTIONAL SHORTENING: 31 % (ref 28–44)
GLUCOSE SERPL-MCNC: 134 MG/DL (ref 70–105)
GLUCOSE SERPL-MCNC: 136 MG/DL (ref 70–105)
GLUCOSE SERPL-MCNC: 155 MG/DL (ref 70–105)
GLUCOSE SERPL-MCNC: 162 MG/DL (ref 74–106)
HCT VFR BLD AUTO: 28.2 % (ref 40–54)
HGB BLD-MCNC: 8.6 G/DL (ref 13.5–18)
IMM GRANULOCYTES # BLD AUTO: 0.02 K/UL (ref 0–0.04)
IMM GRANULOCYTES NFR BLD: 0.3 % (ref 0–0.4)
INTERVENTRICULAR SEPTUM: 1.39 CM (ref 0.6–1.1)
IVC OSTIUM: 0.9 CM
LEFT ATRIUM SIZE: 2.8 CM
LEFT INTERNAL DIMENSION IN SYSTOLE: 2.45 CM (ref 2.1–4)
LEFT VENTRICLE DIASTOLIC VOLUME INDEX: 27.04 ML/M2
LEFT VENTRICLE DIASTOLIC VOLUME: 53 ML
LEFT VENTRICLE MASS INDEX: 82 G/M2
LEFT VENTRICLE SYSTOLIC VOLUME INDEX: 10.8 ML/M2
LEFT VENTRICLE SYSTOLIC VOLUME: 21.2 ML
LEFT VENTRICULAR INTERNAL DIMENSION IN DIASTOLE: 3.56 CM (ref 3.5–6)
LEFT VENTRICULAR MASS: 160.39 G
LVOT MG: 1 MMHG
LYMPHOCYTES # BLD AUTO: 1.17 K/UL (ref 1–4.8)
LYMPHOCYTES NFR BLD AUTO: 16.5 % (ref 27–41)
MACROCYTES BLD QL SMEAR: ABNORMAL
MCH RBC QN AUTO: 30.5 PG (ref 27–31)
MCHC RBC AUTO-ENTMCNC: 30.5 G/DL (ref 32–36)
MCV RBC AUTO: 100 FL (ref 80–96)
MONOCYTES # BLD AUTO: 0.51 K/UL (ref 0–0.8)
MONOCYTES NFR BLD AUTO: 7.2 % (ref 2–6)
MPC BLD CALC-MCNC: 13.1 FL (ref 9.4–12.4)
MV PEAK E VEL: 0.51 M/S
NEUTROPHILS # BLD AUTO: 5.21 K/UL (ref 1.8–7.7)
NEUTROPHILS NFR BLD AUTO: 73.2 % (ref 53–65)
NRBC # BLD AUTO: 0 X10E3/UL
NRBC, AUTO (.00): 0 %
PISA AR MAX VEL: 2.42 M/S
PLATELET # BLD AUTO: 162 K/UL (ref 150–400)
PLATELET MORPHOLOGY: ABNORMAL
POTASSIUM SERPL-SCNC: 3.3 MMOL/L (ref 3.5–5.1)
RA MAJOR: 2.9 CM
RA PRESSURE: 3 MMHG
RBC # BLD AUTO: 2.82 M/UL (ref 4.6–6.2)
RIGHT VENTRICULAR END-DIASTOLIC DIMENSION: 2.5 CM
SODIUM SERPL-SCNC: 149 MMOL/L (ref 136–145)
TRICUSPID ANNULAR PLANE SYSTOLIC EXCURSION: 1.7 CM
UA COMPLETE W REFLEX CULTURE PNL UR: NO GROWTH
WBC # BLD AUTO: 7.11 K/UL (ref 4.5–11)

## 2022-06-21 PROCEDURE — 36415 COLL VENOUS BLD VENIPUNCTURE: CPT | Performed by: FAMILY MEDICINE

## 2022-06-21 PROCEDURE — 94640 AIRWAY INHALATION TREATMENT: CPT

## 2022-06-21 PROCEDURE — 94761 N-INVAS EAR/PLS OXIMETRY MLT: CPT

## 2022-06-21 PROCEDURE — 25000003 PHARM REV CODE 250: Performed by: FAMILY MEDICINE

## 2022-06-21 PROCEDURE — 85730 THROMBOPLASTIN TIME PARTIAL: CPT

## 2022-06-21 PROCEDURE — 85730 THROMBOPLASTIN TIME PARTIAL: CPT | Performed by: FAMILY MEDICINE

## 2022-06-21 PROCEDURE — 25000003 PHARM REV CODE 250

## 2022-06-21 PROCEDURE — C9399 UNCLASSIFIED DRUGS OR BIOLOG: HCPCS | Performed by: FAMILY MEDICINE

## 2022-06-21 PROCEDURE — 99223 PR INITIAL HOSPITAL CARE,LEVL III: ICD-10-PCS | Mod: ,,, | Performed by: INTERNAL MEDICINE

## 2022-06-21 PROCEDURE — 99223 1ST HOSP IP/OBS HIGH 75: CPT | Mod: ,,, | Performed by: INTERNAL MEDICINE

## 2022-06-21 PROCEDURE — 85025 COMPLETE CBC W/AUTO DIFF WBC: CPT

## 2022-06-21 PROCEDURE — 99900035 HC TECH TIME PER 15 MIN (STAT)

## 2022-06-21 PROCEDURE — 36415 COLL VENOUS BLD VENIPUNCTURE: CPT

## 2022-06-21 PROCEDURE — 63600175 PHARM REV CODE 636 W HCPCS

## 2022-06-21 PROCEDURE — 80048 BASIC METABOLIC PNL TOTAL CA: CPT

## 2022-06-21 PROCEDURE — 99900026 HC AIRWAY MAINTENANCE (STAT)

## 2022-06-21 PROCEDURE — 87070 CULTURE OTHR SPECIMN AEROBIC: CPT

## 2022-06-21 PROCEDURE — A6212 FOAM DRG <=16 SQ IN W/BORDER: HCPCS

## 2022-06-21 PROCEDURE — 82962 GLUCOSE BLOOD TEST: CPT

## 2022-06-21 PROCEDURE — 27000221 HC OXYGEN, UP TO 24 HOURS

## 2022-06-21 PROCEDURE — 63600175 PHARM REV CODE 636 W HCPCS: Performed by: FAMILY MEDICINE

## 2022-06-21 PROCEDURE — C9113 INJ PANTOPRAZOLE SODIUM, VIA: HCPCS

## 2022-06-21 PROCEDURE — 27000603 HC FOOT DROP INFLATABLE

## 2022-06-21 PROCEDURE — 25000242 PHARM REV CODE 250 ALT 637 W/ HCPCS: Performed by: FAMILY MEDICINE

## 2022-06-21 PROCEDURE — 11000001 HC ACUTE MED/SURG PRIVATE ROOM

## 2022-06-21 PROCEDURE — 25000242 PHARM REV CODE 250 ALT 637 W/ HCPCS

## 2022-06-21 PROCEDURE — 27000620

## 2022-06-21 PROCEDURE — 87186 SC STD MICRODIL/AGAR DIL: CPT

## 2022-06-21 RX ORDER — ATORVASTATIN CALCIUM 80 MG/1
80 TABLET, FILM COATED ORAL DAILY
Status: DISCONTINUED | OUTPATIENT
Start: 2022-06-22 | End: 2022-06-22

## 2022-06-21 RX ORDER — LEVETIRACETAM 500 MG/1
1000 TABLET ORAL 2 TIMES DAILY
Status: CANCELLED | OUTPATIENT
Start: 2022-06-21

## 2022-06-21 RX ORDER — GLUCAGON 1 MG
1 KIT INJECTION
Status: CANCELLED | OUTPATIENT
Start: 2022-06-21

## 2022-06-21 RX ORDER — IPRATROPIUM BROMIDE AND ALBUTEROL SULFATE 2.5; .5 MG/3ML; MG/3ML
3 SOLUTION RESPIRATORY (INHALATION) EVERY 4 HOURS PRN
Status: DISCONTINUED | OUTPATIENT
Start: 2022-06-21 | End: 2022-06-27 | Stop reason: HOSPADM

## 2022-06-21 RX ORDER — SERTRALINE HYDROCHLORIDE 50 MG/1
100 TABLET, FILM COATED ORAL DAILY
Status: DISCONTINUED | OUTPATIENT
Start: 2022-06-22 | End: 2022-06-22

## 2022-06-21 RX ORDER — ASPIRIN 81 MG/1
81 TABLET ORAL DAILY
Status: DISCONTINUED | OUTPATIENT
Start: 2022-06-22 | End: 2022-06-21

## 2022-06-21 RX ORDER — POLYETHYLENE GLYCOL 3350 17 G/17G
17 POWDER, FOR SOLUTION ORAL DAILY
Status: DISCONTINUED | OUTPATIENT
Start: 2022-06-22 | End: 2022-06-21

## 2022-06-21 RX ORDER — IPRATROPIUM BROMIDE AND ALBUTEROL SULFATE 2.5; .5 MG/3ML; MG/3ML
3 SOLUTION RESPIRATORY (INHALATION) EVERY 6 HOURS
Status: DISCONTINUED | OUTPATIENT
Start: 2022-06-21 | End: 2022-06-27 | Stop reason: HOSPADM

## 2022-06-21 RX ORDER — AMLODIPINE BESYLATE 5 MG/1
5 TABLET ORAL DAILY
Status: DISCONTINUED | OUTPATIENT
Start: 2022-06-22 | End: 2022-06-22

## 2022-06-21 RX ORDER — ASPIRIN 81 MG/1
81 TABLET ORAL DAILY
Status: DISCONTINUED | OUTPATIENT
Start: 2022-06-22 | End: 2022-06-22

## 2022-06-21 RX ORDER — POTASSIUM CHLORIDE 20 MEQ/1
40 TABLET, EXTENDED RELEASE ORAL 2 TIMES DAILY
Status: CANCELLED | OUTPATIENT
Start: 2022-06-21

## 2022-06-21 RX ORDER — AMLODIPINE BESYLATE 5 MG/1
5 TABLET ORAL DAILY
Status: CANCELLED | OUTPATIENT
Start: 2022-06-22

## 2022-06-21 RX ORDER — ATORVASTATIN CALCIUM 80 MG/1
80 TABLET, FILM COATED ORAL DAILY
Status: CANCELLED | OUTPATIENT
Start: 2022-06-21

## 2022-06-21 RX ORDER — PHENYTOIN 125 MG/5ML
200 SUSPENSION ORAL 2 TIMES DAILY
Status: DISCONTINUED | OUTPATIENT
Start: 2022-06-21 | End: 2022-06-22

## 2022-06-21 RX ORDER — DONEPEZIL HYDROCHLORIDE 5 MG/1
10 TABLET, FILM COATED ORAL NIGHTLY
Status: DISCONTINUED | OUTPATIENT
Start: 2022-06-21 | End: 2022-06-21

## 2022-06-21 RX ORDER — ATORVASTATIN CALCIUM 80 MG/1
80 TABLET, FILM COATED ORAL DAILY
Status: DISCONTINUED | OUTPATIENT
Start: 2022-06-22 | End: 2022-06-21

## 2022-06-21 RX ORDER — IPRATROPIUM BROMIDE AND ALBUTEROL SULFATE 2.5; .5 MG/3ML; MG/3ML
3 SOLUTION RESPIRATORY (INHALATION) EVERY 4 HOURS PRN
Status: CANCELLED | OUTPATIENT
Start: 2022-06-21

## 2022-06-21 RX ORDER — PANTOPRAZOLE SODIUM 40 MG/10ML
40 INJECTION, POWDER, LYOPHILIZED, FOR SOLUTION INTRAVENOUS DAILY
Status: CANCELLED | OUTPATIENT
Start: 2022-06-22

## 2022-06-21 RX ORDER — METFORMIN HYDROCHLORIDE 500 MG/1
500 TABLET ORAL 2 TIMES DAILY WITH MEALS
Status: DISCONTINUED | OUTPATIENT
Start: 2022-06-21 | End: 2022-06-22

## 2022-06-21 RX ORDER — PHENYTOIN 125 MG/5ML
200 SUSPENSION ORAL 2 TIMES DAILY
Status: DISCONTINUED | OUTPATIENT
Start: 2022-06-21 | End: 2022-06-21

## 2022-06-21 RX ORDER — ONDANSETRON 2 MG/ML
4 INJECTION INTRAMUSCULAR; INTRAVENOUS EVERY 8 HOURS PRN
Status: DISCONTINUED | OUTPATIENT
Start: 2022-06-21 | End: 2022-06-27 | Stop reason: HOSPADM

## 2022-06-21 RX ORDER — ONDANSETRON 2 MG/ML
4 INJECTION INTRAMUSCULAR; INTRAVENOUS EVERY 8 HOURS PRN
Status: CANCELLED | OUTPATIENT
Start: 2022-06-21

## 2022-06-21 RX ORDER — SERTRALINE HYDROCHLORIDE 50 MG/1
100 TABLET, FILM COATED ORAL DAILY
Status: DISCONTINUED | OUTPATIENT
Start: 2022-06-22 | End: 2022-06-21

## 2022-06-21 RX ORDER — PHENYTOIN 125 MG/5ML
200 SUSPENSION ORAL 2 TIMES DAILY
Status: CANCELLED | OUTPATIENT
Start: 2022-06-21

## 2022-06-21 RX ORDER — METOPROLOL TARTRATE 25 MG/1
12.5 TABLET ORAL 2 TIMES DAILY
Status: CANCELLED | OUTPATIENT
Start: 2022-06-21

## 2022-06-21 RX ORDER — INSULIN ASPART 100 [IU]/ML
1-10 INJECTION, SOLUTION INTRAVENOUS; SUBCUTANEOUS
Status: CANCELLED | OUTPATIENT
Start: 2022-06-21

## 2022-06-21 RX ORDER — AMLODIPINE BESYLATE 5 MG/1
5 TABLET ORAL DAILY
Status: CANCELLED | OUTPATIENT
Start: 2022-06-21

## 2022-06-21 RX ORDER — INSULIN ASPART 100 [IU]/ML
1-10 INJECTION, SOLUTION INTRAVENOUS; SUBCUTANEOUS
Status: DISCONTINUED | OUTPATIENT
Start: 2022-06-21 | End: 2022-06-27 | Stop reason: HOSPADM

## 2022-06-21 RX ORDER — ACETAMINOPHEN 325 MG/1
650 TABLET ORAL EVERY 8 HOURS PRN
Status: CANCELLED | OUTPATIENT
Start: 2022-06-21

## 2022-06-21 RX ORDER — POTASSIUM CHLORIDE 20 MEQ/1
40 TABLET, EXTENDED RELEASE ORAL 2 TIMES DAILY
Status: DISCONTINUED | OUTPATIENT
Start: 2022-06-21 | End: 2022-06-22

## 2022-06-21 RX ORDER — PANTOPRAZOLE SODIUM 40 MG/1
40 TABLET, DELAYED RELEASE ORAL DAILY
Status: CANCELLED | OUTPATIENT
Start: 2022-06-21

## 2022-06-21 RX ORDER — SERTRALINE HYDROCHLORIDE 50 MG/1
100 TABLET, FILM COATED ORAL DAILY
Status: CANCELLED | OUTPATIENT
Start: 2022-06-22

## 2022-06-21 RX ORDER — METFORMIN HYDROCHLORIDE 500 MG/1
500 TABLET ORAL 2 TIMES DAILY WITH MEALS
Status: CANCELLED | OUTPATIENT
Start: 2022-06-21

## 2022-06-21 RX ORDER — ATORVASTATIN CALCIUM 80 MG/1
80 TABLET, FILM COATED ORAL DAILY
Status: CANCELLED | OUTPATIENT
Start: 2022-06-22

## 2022-06-21 RX ORDER — ACETAMINOPHEN 160 MG/5ML
325 SOLUTION ORAL EVERY 6 HOURS PRN
Status: CANCELLED | OUTPATIENT
Start: 2022-06-21

## 2022-06-21 RX ORDER — GLUCAGON 1 MG
1 KIT INJECTION
Status: DISCONTINUED | OUTPATIENT
Start: 2022-06-21 | End: 2022-06-27 | Stop reason: HOSPADM

## 2022-06-21 RX ORDER — METOPROLOL TARTRATE 25 MG/1
12.5 TABLET ORAL 2 TIMES DAILY
Status: DISCONTINUED | OUTPATIENT
Start: 2022-06-21 | End: 2022-06-22

## 2022-06-21 RX ORDER — SODIUM CHLORIDE 0.9 % (FLUSH) 0.9 %
10 SYRINGE (ML) INJECTION EVERY 12 HOURS PRN
Status: DISCONTINUED | OUTPATIENT
Start: 2022-06-21 | End: 2022-06-27 | Stop reason: HOSPADM

## 2022-06-21 RX ORDER — ASPIRIN 81 MG/1
81 TABLET ORAL DAILY
Status: CANCELLED | OUTPATIENT
Start: 2022-06-21

## 2022-06-21 RX ORDER — IPRATROPIUM BROMIDE AND ALBUTEROL SULFATE 2.5; .5 MG/3ML; MG/3ML
3 SOLUTION RESPIRATORY (INHALATION) EVERY 6 HOURS
Status: CANCELLED | OUTPATIENT
Start: 2022-06-21

## 2022-06-21 RX ORDER — SERTRALINE HYDROCHLORIDE 50 MG/1
100 TABLET, FILM COATED ORAL DAILY
Status: CANCELLED | OUTPATIENT
Start: 2022-06-21

## 2022-06-21 RX ORDER — AMLODIPINE BESYLATE 5 MG/1
5 TABLET ORAL DAILY
Status: DISCONTINUED | OUTPATIENT
Start: 2022-06-22 | End: 2022-06-21

## 2022-06-21 RX ORDER — POLYETHYLENE GLYCOL 3350 17 G/17G
17 POWDER, FOR SOLUTION ORAL DAILY
Status: DISCONTINUED | OUTPATIENT
Start: 2022-06-22 | End: 2022-06-22

## 2022-06-21 RX ORDER — ASPIRIN 81 MG/1
81 TABLET ORAL DAILY
Status: CANCELLED | OUTPATIENT
Start: 2022-06-22

## 2022-06-21 RX ORDER — ACETAMINOPHEN 160 MG/5ML
325 SOLUTION ORAL EVERY 6 HOURS PRN
Status: DISCONTINUED | OUTPATIENT
Start: 2022-06-21 | End: 2022-06-22

## 2022-06-21 RX ORDER — ALBUTEROL SULFATE 0.83 MG/ML
5 SOLUTION RESPIRATORY (INHALATION) 4 TIMES DAILY PRN
Status: CANCELLED | OUTPATIENT
Start: 2022-06-21

## 2022-06-21 RX ORDER — SODIUM CHLORIDE 0.9 % (FLUSH) 0.9 %
10 SYRINGE (ML) INJECTION EVERY 12 HOURS PRN
Status: CANCELLED | OUTPATIENT
Start: 2022-06-21

## 2022-06-21 RX ORDER — ALBUTEROL SULFATE 0.83 MG/ML
5 SOLUTION RESPIRATORY (INHALATION) 4 TIMES DAILY PRN
Status: DISCONTINUED | OUTPATIENT
Start: 2022-06-21 | End: 2022-06-27 | Stop reason: HOSPADM

## 2022-06-21 RX ORDER — DONEPEZIL HYDROCHLORIDE 5 MG/1
10 TABLET, FILM COATED ORAL NIGHTLY
Status: DISCONTINUED | OUTPATIENT
Start: 2022-06-21 | End: 2022-06-22

## 2022-06-21 RX ORDER — POTASSIUM CHLORIDE 20 MEQ/1
40 TABLET, EXTENDED RELEASE ORAL 2 TIMES DAILY
Status: DISCONTINUED | OUTPATIENT
Start: 2022-06-21 | End: 2022-06-21 | Stop reason: HOSPADM

## 2022-06-21 RX ORDER — LEVETIRACETAM 250 MG/1
1000 TABLET ORAL 2 TIMES DAILY
Status: DISCONTINUED | OUTPATIENT
Start: 2022-06-21 | End: 2022-06-22

## 2022-06-21 RX ORDER — IBUPROFEN 200 MG
24 TABLET ORAL
Status: DISCONTINUED | OUTPATIENT
Start: 2022-06-21 | End: 2022-06-27 | Stop reason: HOSPADM

## 2022-06-21 RX ORDER — MUPIROCIN 20 MG/G
OINTMENT TOPICAL 2 TIMES DAILY
Status: DISPENSED | OUTPATIENT
Start: 2022-06-21 | End: 2022-06-26

## 2022-06-21 RX ORDER — LEVETIRACETAM 250 MG/1
1000 TABLET ORAL 2 TIMES DAILY
Status: DISCONTINUED | OUTPATIENT
Start: 2022-06-21 | End: 2022-06-21

## 2022-06-21 RX ORDER — PANTOPRAZOLE SODIUM 40 MG/1
40 TABLET, DELAYED RELEASE ORAL DAILY
Status: DISCONTINUED | OUTPATIENT
Start: 2022-06-22 | End: 2022-06-21

## 2022-06-21 RX ORDER — DONEPEZIL HYDROCHLORIDE 5 MG/1
10 TABLET, FILM COATED ORAL NIGHTLY
Status: CANCELLED | OUTPATIENT
Start: 2022-06-21

## 2022-06-21 RX ORDER — IBUPROFEN 200 MG
16 TABLET ORAL
Status: CANCELLED | OUTPATIENT
Start: 2022-06-21

## 2022-06-21 RX ORDER — PANTOPRAZOLE SODIUM 40 MG/10ML
40 INJECTION, POWDER, LYOPHILIZED, FOR SOLUTION INTRAVENOUS DAILY
Status: DISCONTINUED | OUTPATIENT
Start: 2022-06-22 | End: 2022-06-27 | Stop reason: HOSPADM

## 2022-06-21 RX ORDER — IBUPROFEN 200 MG
16 TABLET ORAL
Status: DISCONTINUED | OUTPATIENT
Start: 2022-06-21 | End: 2022-06-27 | Stop reason: HOSPADM

## 2022-06-21 RX ORDER — POLYETHYLENE GLYCOL 3350 17 G/17G
17 POWDER, FOR SOLUTION ORAL DAILY
Status: CANCELLED | OUTPATIENT
Start: 2022-06-22

## 2022-06-21 RX ORDER — POLYETHYLENE GLYCOL 3350 17 G/17G
17 POWDER, FOR SOLUTION ORAL DAILY
Status: CANCELLED | OUTPATIENT
Start: 2022-06-21

## 2022-06-21 RX ORDER — IBUPROFEN 200 MG
24 TABLET ORAL
Status: CANCELLED | OUTPATIENT
Start: 2022-06-21

## 2022-06-21 RX ORDER — ACETAMINOPHEN 325 MG/1
650 TABLET ORAL EVERY 8 HOURS PRN
Status: DISCONTINUED | OUTPATIENT
Start: 2022-06-21 | End: 2022-06-22

## 2022-06-21 RX ADMIN — APIXABAN 5 MG: 5 TABLET, FILM COATED ORAL at 11:06

## 2022-06-21 RX ADMIN — VANCOMYCIN HYDROCHLORIDE 1500 MG: 5 INJECTION, POWDER, LYOPHILIZED, FOR SOLUTION INTRAVENOUS at 02:06

## 2022-06-21 RX ADMIN — MUPIROCIN: 20 OINTMENT TOPICAL at 09:06

## 2022-06-21 RX ADMIN — POTASSIUM CHLORIDE 40 MEQ: 20 TABLET, EXTENDED RELEASE ORAL at 09:06

## 2022-06-21 RX ADMIN — METOPROLOL TARTRATE 12.5 MG: 25 TABLET, FILM COATED ORAL at 09:06

## 2022-06-21 RX ADMIN — PIPERACILLIN AND TAZOBACTAM 4.5 G: 4; .5 INJECTION, POWDER, FOR SOLUTION INTRAVENOUS at 04:06

## 2022-06-21 RX ADMIN — APIXABAN 5 MG: 5 TABLET, FILM COATED ORAL at 09:06

## 2022-06-21 RX ADMIN — PHENYTOIN 200 MG: 125 SUSPENSION ORAL at 08:06

## 2022-06-21 RX ADMIN — IPRATROPIUM BROMIDE AND ALBUTEROL SULFATE 3 ML: 2.5; .5 SOLUTION RESPIRATORY (INHALATION) at 07:06

## 2022-06-21 RX ADMIN — AMLODIPINE BESYLATE 5 MG: 5 TABLET ORAL at 08:06

## 2022-06-21 RX ADMIN — PIPERACILLIN SODIUM AND TAZOBACTAM SODIUM 4.5 G: 4; 500 INJECTION, POWDER, FOR SOLUTION INTRAVENOUS at 09:06

## 2022-06-21 RX ADMIN — LEVETIRACETAM 1000 MG: 250 TABLET, FILM COATED ORAL at 09:06

## 2022-06-21 RX ADMIN — ATORVASTATIN CALCIUM 80 MG: 80 TABLET, FILM COATED ORAL at 08:06

## 2022-06-21 RX ADMIN — INSULIN DETEMIR 10 UNITS: 100 INJECTION, SOLUTION SUBCUTANEOUS at 09:06

## 2022-06-21 RX ADMIN — PANTOPRAZOLE SODIUM 40 MG: 40 INJECTION, POWDER, FOR SOLUTION INTRAVENOUS at 08:06

## 2022-06-21 RX ADMIN — POLYETHYLENE GLYCOL 3350 17 G: 17 POWDER, FOR SOLUTION ORAL at 08:06

## 2022-06-21 RX ADMIN — METOPROLOL TARTRATE 12.5 MG: 25 TABLET, FILM COATED ORAL at 08:06

## 2022-06-21 RX ADMIN — IPRATROPIUM BROMIDE AND ALBUTEROL SULFATE 3 ML: .5; 3 SOLUTION RESPIRATORY (INHALATION) at 01:06

## 2022-06-21 RX ADMIN — ASPIRIN 81 MG: 81 TABLET, COATED ORAL at 08:06

## 2022-06-21 RX ADMIN — SERTRALINE HYDROCHLORIDE 100 MG: 50 TABLET ORAL at 08:06

## 2022-06-21 RX ADMIN — LEVETIRACETAM 1000 MG: 500 TABLET, FILM COATED ORAL at 08:06

## 2022-06-21 RX ADMIN — DONEPEZIL HYDROCHLORIDE 10 MG: 5 TABLET ORAL at 09:06

## 2022-06-21 RX ADMIN — PIPERACILLIN AND TAZOBACTAM 4.5 G: 4; .5 INJECTION, POWDER, FOR SOLUTION INTRAVENOUS at 12:06

## 2022-06-21 RX ADMIN — POTASSIUM CHLORIDE 40 MEQ: 20 TABLET, EXTENDED RELEASE ORAL at 08:06

## 2022-06-21 RX ADMIN — IPRATROPIUM BROMIDE AND ALBUTEROL SULFATE 3 ML: .5; 3 SOLUTION RESPIRATORY (INHALATION) at 07:06

## 2022-06-21 NOTE — NURSING
Spoke with Lisa in Specialty to give report.  She asked that I give her a little while because they just got 2 admits back to back.      My extension number given.

## 2022-06-21 NOTE — ASSESSMENT & PLAN NOTE
- cardiology consulted, recommend continuing medical management and patient is a poor candidate for invasive procedures

## 2022-06-21 NOTE — NURSING
"Consulted lab regarding APTT results due @ 2030 et was informed that  was not able to obtain specimen.  Further stated that she is the "only one here tonight and someone else will be here @ 12 (midnight) to try".  Notified Dr. Cantor who advised to continue Heparin infusion et await lab redraw around MN.  "

## 2022-06-21 NOTE — NURSING
Third attempt to call report.  All nurses again are on the floor.  Left a message to call me ASAP for report.

## 2022-06-21 NOTE — SUBJECTIVE & OBJECTIVE
Past Medical History:   Diagnosis Date    Coronary artery disease     Dementia     Depression     Diabetes mellitus     GERD (gastroesophageal reflux disease)     PVD (peripheral vascular disease)     Seizures     Stroke        Past Surgical History:   Procedure Laterality Date    BELOW KNEE AMPUTATION OF LOWER EXTREMITY Left     GASTROSTOMY TUBE PLACEMENT         Review of patient's allergies indicates:  No Known Allergies    Current Facility-Administered Medications on File Prior to Encounter   Medication    acetaminophen tablet 650 mg    albuterol-ipratropium 2.5 mg-0.5 mg/3 mL nebulizer solution 3 mL    amLODIPine tablet 5 mg    apixaban tablet 5 mg    aspirin EC tablet 81 mg    atorvastatin tablet 80 mg    dextrose 50% injection 12.5 g    dextrose 50% injection 25 g    donepeziL tablet 10 mg    glucagon (human recombinant) injection 1 mg    glucose chewable tablet 16 g    glucose chewable tablet 24 g    insulin aspart U-100 injection 1-10 Units    levETIRAcetam tablet 1,000 mg    metoprolol tartrate (LOPRESSOR) split tablet 12.5 mg    ondansetron injection 4 mg    pantoprazole injection 40 mg    phenytoin 125 mg/5 mL suspension 200 mg    piperacillin-tazobactam (ZOSYN) 4.5 g in dextrose 5 % in water (D5W) 5 % 100 mL IVPB (MB+)    polyethylene glycol packet 17 g    potassium chloride SA CR tablet 40 mEq    sertraline tablet 100 mg    sodium chloride 0.9% bolus 1,000 mL    sodium chloride 0.9% flush 10 mL    vancomycin (VANCOCIN) 1,500 mg in dextrose 5 % 250 mL IVPB    vancomycin - pharmacy to dose    [DISCONTINUED] 0.45% NaCl infusion    [DISCONTINUED] 0.9%  NaCl infusion    [DISCONTINUED] heparin 25,000 units in dextrose 5% (100 units/ml) IV bolus from bag - ADDITIONAL PRN BOLUS - 30 units/kg (max bolus 4000 units)    [DISCONTINUED] heparin 25,000 units in dextrose 5% (100 units/ml) IV bolus from bag - ADDITIONAL PRN BOLUS - 30 units/kg    [DISCONTINUED] heparin 25,000 units in dextrose 5% (100 units/ml) IV  bolus from bag - ADDITIONAL PRN BOLUS - 60 units/kg    [DISCONTINUED] heparin 25,000 units in dextrose 5% (100 units/ml) IV bolus from bag INITIAL BOLUS (max bolus 4000 units)    [DISCONTINUED] heparin 25,000 units in dextrose 5% 250 mL (100 units/mL) infusion HIGH INTENSITY nomogram - [facility]    [DISCONTINUED] heparin 25,000 units in dextrose 5% 250 mL (100 units/mL) infusion LOW INTENSITY nomogram - [facility]    [DISCONTINUED] sodium chloride (23.4%) HYPERTONIC 4 mEq/mL 38.52 mEq in sterile water 1,000 mL infusion     Current Outpatient Medications on File Prior to Encounter   Medication Sig    acetaminophen (TYLENOL) 650 MG TbSR Take 650 mg by mouth every 4 (four) hours as needed.    albuterol (PROVENTIL) 2.5 mg /3 mL (0.083 %) nebulizer solution Take by nebulization.    albuterol-ipratropium (DUO-NEB) 2.5 mg-0.5 mg/3 mL nebulizer solution SMARTSIG:3 Milliliter(s) Via Nebulizer Every 6 Hours    amLODIPine (NORVASC) 5 MG tablet Take 5 mg by mouth once daily.    aspirin (ECOTRIN) 81 MG EC tablet Take 81 mg by mouth once daily.    atorvastatin (LIPITOR) 80 MG tablet Take 80 mg by mouth once daily.    donepeziL (ARICEPT) 10 MG tablet Take 10 mg by mouth every evening.    ELIQUIS 5 mg Tab Take 5 mg by mouth 2 (two) times a day.    insulin glargine (LANTUS) 100 unit/mL injection Inject 10 Units into the skin every evening.    levETIRAcetam (KEPPRA) 1000 MG tablet Take 1,000 mg by mouth 2 (two) times daily.    metFORMIN (GLUCOPHAGE) 500 MG tablet Take 500 mg by mouth 2 (two) times daily with meals.    multivitamin (THERAGRAN) per tablet Take 1 tablet by mouth once daily.    omeprazole (PRILOSEC) 20 MG capsule Take 20 mg by mouth once daily.    phenytoin (DILANTIN) 125 mg/5 mL suspension Take 8 mLs by mouth 2 (two) times a day.    polyethylene glycol (GLYCOLAX) 17 gram PwPk Take 17 g by mouth once daily.    sertraline (ZOLOFT) 100 MG tablet Take 100 mg by mouth once daily.    [DISCONTINUED] predniSONE (DELTASONE) 20 MG tablet  Take 20 mg by mouth once daily.    [DISCONTINUED] vancomycin (VANCOCIN) 125 MG capsule Take 125 mg by mouth 4 (four) times daily.     Family History    None       Tobacco Use    Smoking status: Unknown If Ever Smoked    Smokeless tobacco: Never Used   Substance and Sexual Activity    Alcohol use: Not Currently    Drug use: Not Currently    Sexual activity: Not Currently     Review of Systems   Unable to perform ROS: Dementia   Objective:     Vital Signs (Most Recent):    Vital Signs (24h Range):  Temp:  [97.4 °F (36.3 °C)-98.3 °F (36.8 °C)] 98.3 °F (36.8 °C)  Pulse:  [62-94] 83  Resp:  [16-24] 22  SpO2:  [94 %-100 %] 100 %  BP: (121-161)/() 136/83        There is no height or weight on file to calculate BMI.    Physical Exam  Vitals and nursing note reviewed.   Constitutional:       General: He is not in acute distress.     Appearance: He is ill-appearing. He is not toxic-appearing.   HENT:      Head: Normocephalic.      Right Ear: External ear normal.      Left Ear: External ear normal.      Mouth/Throat:      Mouth: Mucous membranes are moist.   Eyes:      General:         Right eye: No discharge.         Left eye: No discharge.      Conjunctiva/sclera: Conjunctivae normal.   Cardiovascular:      Rate and Rhythm: Normal rate and regular rhythm.      Pulses: Normal pulses.      Heart sounds: Normal heart sounds. No murmur heard.  Pulmonary:      Breath sounds: No wheezing or rales.   Chest:      Chest wall: No tenderness.   Abdominal:      General: Bowel sounds are normal. There is no distension.      Tenderness: There is no abdominal tenderness. There is no guarding.   Musculoskeletal:      Cervical back: Neck supple.      Right lower leg: No edema.      Left lower leg: No edema.   Skin:     General: Skin is warm.      Findings: Lesion present.   Neurological:      Mental Status: Mental status is at baseline.           Significant Labs: All pertinent labs within the past 24 hours have been  reviewed.    Significant Imaging: I have reviewed all pertinent imaging results/findings within the past 24 hours.

## 2022-06-21 NOTE — PLAN OF CARE
Problem: Adult Inpatient Plan of Care  Goal: Plan of Care Review  Outcome: Ongoing, Progressing  Goal: Patient-Specific Goal (Individualized)  Outcome: Ongoing, Progressing  Goal: Absence of Hospital-Acquired Illness or Injury  Outcome: Ongoing, Progressing  Goal: Optimal Comfort and Wellbeing  Outcome: Ongoing, Progressing  Goal: Readiness for Transition of Care  Outcome: Ongoing, Progressing     Problem: Diabetes Comorbidity  Goal: Blood Glucose Level Within Targeted Range  Outcome: Ongoing, Progressing     Problem: Adjustment to Illness (Sepsis/Septic Shock)  Goal: Optimal Coping  Outcome: Ongoing, Progressing     Problem: Bleeding (Sepsis/Septic Shock)  Goal: Absence of Bleeding  Outcome: Ongoing, Progressing     Problem: Glycemic Control Impaired (Sepsis/Septic Shock)  Goal: Blood Glucose Level Within Desired Range  Outcome: Ongoing, Progressing     Problem: Infection Progression (Sepsis/Septic Shock)  Goal: Absence of Infection Signs and Symptoms  Outcome: Ongoing, Progressing     Problem: Skin Injury Risk Increased  Goal: Skin Health and Integrity  Outcome: Ongoing, Progressing     Problem: Fall Injury Risk  Goal: Absence of Fall and Fall-Related Injury  Outcome: Ongoing, Progressing     Problem: Impaired Wound Healing  Goal: Optimal Wound Healing  Outcome: Ongoing, Progressing     Problem: Gas Exchange Impaired  Goal: Optimal Gas Exchange  Outcome: Ongoing, Progressing   POC reviewed and continues

## 2022-06-21 NOTE — PLAN OF CARE
[facility]  Discharge Final Note    Primary Care Provider: Primary Doctor No    Expected Discharge Date:     Final Discharge Note (most recent)     Final Note - 06/21/22 1127        Final Note    Assessment Type Final Discharge Note     Anticipated Discharge Disposition Long Term Acute Care        Post-Acute Status    Post-Acute Authorization Placement     Post-Acute Placement Status Set-up Complete/Auth obtained     Patient choice form signed by patient/caregiver List with quality metrics by geographic area provided;List from CMS Compare     Discharge Delays None known at this time                 Important Message from Medicare  Important Message from Medicare regarding Discharge Appeal Rights: Given to patient/caregiver, Explained to patient/caregiver, Signed/date by patient/caregiver     Date IMM was signed: 06/20/22  Time IMM was signed: 0944    pt accepted to specialty for today, notified pts sister bassem and she agrees with plan, notified ebony at the [place]

## 2022-06-21 NOTE — PLAN OF CARE
Problem: Adult Inpatient Plan of Care  Goal: Plan of Care Review  Outcome: Ongoing, Progressing  Flowsheets (Taken 6/21/2022 0115)  Plan of Care Reviewed With: (Sister in Law) other (see comments)  Goal: Patient-Specific Goal (Individualized)  Outcome: Ongoing, Progressing  Flowsheets (Taken 6/21/2022 0115)  Individualized Care Needs: Resolve DVT's  Goal: Absence of Hospital-Acquired Illness or Injury  Outcome: Ongoing, Progressing  Goal: Optimal Comfort and Wellbeing  Outcome: Ongoing, Progressing  Goal: Readiness for Transition of Care  Outcome: Ongoing, Progressing     Problem: Diabetes Comorbidity  Goal: Blood Glucose Level Within Targeted Range  Outcome: Ongoing, Progressing     Problem: Adjustment to Illness (Sepsis/Septic Shock)  Goal: Optimal Coping  Outcome: Ongoing, Progressing     Problem: Bleeding (Sepsis/Septic Shock)  Goal: Absence of Bleeding  Outcome: Ongoing, Progressing     Problem: Glycemic Control Impaired (Sepsis/Septic Shock)  Goal: Blood Glucose Level Within Desired Range  Outcome: Ongoing, Progressing     Problem: Infection Progression (Sepsis/Septic Shock)  Goal: Absence of Infection Signs and Symptoms  Outcome: Ongoing, Progressing     Problem: Nutrition Impaired (Sepsis/Septic Shock)  Goal: Optimal Nutrition Intake  Outcome: Ongoing, Progressing     Problem: Skin Injury Risk Increased  Goal: Skin Health and Integrity  Outcome: Ongoing, Progressing     Problem: Fall Injury Risk  Goal: Absence of Fall and Fall-Related Injury  Outcome: Ongoing, Progressing     Problem: Impaired Wound Healing  Goal: Optimal Wound Healing  Outcome: Ongoing, Progressing     Problem: Gas Exchange Impaired  Goal: Optimal Gas Exchange  Outcome: Ongoing, Progressing   POC ongoing without complication

## 2022-06-21 NOTE — HPI
Patient is a 66 yo male from [facility] that has a pmhx of CVA (nonverbal), left BKA, PVD, dysphagia with PEG tube that presented to [facility] with shortness of breath. He was admitted and treated for sepsis 2/2 aspiration pneumonia, elevated troponins, and bilateral LE DVTs. He is on IV Vancomycin and Zosyn that will need to be continued for a total of 10 days. He was on a heparin drip up until today, but has now been transitioned to Eliquis in order to treat DVTs. He will be discharged from [facility] and admitted to [facility] for continued antibiotics.

## 2022-06-21 NOTE — H&P
[facility], [place], operated by [facility]  History & Physical    Patient Name: Mahesh Acuña  MRN: [id number]  Patient Class: IP- Inpatient  Admission Date: (Not on file)  Attending Physician: Umang De Leon Jr., MD   Primary Care Provider: Primary Doctor No         Patient information was obtained from past medical records.     Subjective:     Principal Problem:Aspiration pneumonia    Chief Complaint:   Chief Complaint   Patient presents with    Shortness of Breath        HPI: Patient is a 66 yo male from [facility] that has a pmhx of CVA (nonverbal), left BKA, PVD, dysphagia with PEG tube that presented to [facility] with shortness of breath. He was admitted and treated for sepsis 2/2 aspiration pneumonia, elevated troponins, and bilateral LE DVTs. He is on IV Vancomycin and Zosyn that will need to be continued for a total of 10 days. He was on a heparin drip up until today, but has now been transitioned to Eliquis in order to treat DVTs. He will be discharged from [facility] and admitted to [facility] for continued antibiotics.      Past Medical History:   Diagnosis Date    Coronary artery disease     Dementia     Depression     Diabetes mellitus     GERD (gastroesophageal reflux disease)     PVD (peripheral vascular disease)     Seizures     Stroke        Past Surgical History:   Procedure Laterality Date    BELOW KNEE AMPUTATION OF LOWER EXTREMITY Left     GASTROSTOMY TUBE PLACEMENT         Review of patient's allergies indicates:  No Known Allergies    Current Facility-Administered Medications on File Prior to Encounter   Medication    acetaminophen tablet 650 mg    albuterol-ipratropium 2.5 mg-0.5 mg/3 mL nebulizer solution 3 mL    amLODIPine tablet 5 mg    apixaban tablet 5 mg    aspirin EC tablet 81 mg    atorvastatin tablet 80 mg    dextrose 50% injection 12.5 g    dextrose 50% injection 25 g    donepeziL tablet 10 mg     glucagon (human recombinant) injection 1 mg    glucose chewable tablet 16 g    glucose chewable tablet 24 g    insulin aspart U-100 injection 1-10 Units    levETIRAcetam tablet 1,000 mg    metoprolol tartrate (LOPRESSOR) split tablet 12.5 mg    ondansetron injection 4 mg    pantoprazole injection 40 mg    phenytoin 125 mg/5 mL suspension 200 mg    piperacillin-tazobactam (ZOSYN) 4.5 g in dextrose 5 % in water (D5W) 5 % 100 mL IVPB (MB+)    polyethylene glycol packet 17 g    potassium chloride SA CR tablet 40 mEq    sertraline tablet 100 mg    sodium chloride 0.9% bolus 1,000 mL    sodium chloride 0.9% flush 10 mL    vancomycin (VANCOCIN) 1,500 mg in dextrose 5 % 250 mL IVPB    vancomycin - pharmacy to dose    [DISCONTINUED] 0.45% NaCl infusion    [DISCONTINUED] 0.9%  NaCl infusion    [DISCONTINUED] heparin 25,000 units in dextrose 5% (100 units/ml) IV bolus from bag - ADDITIONAL PRN BOLUS - 30 units/kg (max bolus 4000 units)    [DISCONTINUED] heparin 25,000 units in dextrose 5% (100 units/ml) IV bolus from bag - ADDITIONAL PRN BOLUS - 30 units/kg    [DISCONTINUED] heparin 25,000 units in dextrose 5% (100 units/ml) IV bolus from bag - ADDITIONAL PRN BOLUS - 60 units/kg    [DISCONTINUED] heparin 25,000 units in dextrose 5% (100 units/ml) IV bolus from bag INITIAL BOLUS (max bolus 4000 units)    [DISCONTINUED] heparin 25,000 units in dextrose 5% 250 mL (100 units/mL) infusion HIGH INTENSITY nomogram - [facility]    [DISCONTINUED] heparin 25,000 units in dextrose 5% 250 mL (100 units/mL) infusion LOW INTENSITY nomogram - [facility]    [DISCONTINUED] sodium chloride (23.4%) HYPERTONIC 4 mEq/mL 38.52 mEq in sterile water 1,000 mL infusion     Current Outpatient Medications on File Prior to Encounter   Medication Sig    acetaminophen (TYLENOL) 650 MG TbSR Take 650 mg by mouth every 4 (four) hours as needed.    albuterol (PROVENTIL) 2.5 mg /3 mL (0.083 %) nebulizer solution Take by nebulization.     albuterol-ipratropium (DUO-NEB) 2.5 mg-0.5 mg/3 mL nebulizer solution SMARTSIG:3 Milliliter(s) Via Nebulizer Every 6 Hours    amLODIPine (NORVASC) 5 MG tablet Take 5 mg by mouth once daily.    aspirin (ECOTRIN) 81 MG EC tablet Take 81 mg by mouth once daily.    atorvastatin (LIPITOR) 80 MG tablet Take 80 mg by mouth once daily.    donepeziL (ARICEPT) 10 MG tablet Take 10 mg by mouth every evening.    ELIQUIS 5 mg Tab Take 5 mg by mouth 2 (two) times a day.    insulin glargine (LANTUS) 100 unit/mL injection Inject 10 Units into the skin every evening.    levETIRAcetam (KEPPRA) 1000 MG tablet Take 1,000 mg by mouth 2 (two) times daily.    metFORMIN (GLUCOPHAGE) 500 MG tablet Take 500 mg by mouth 2 (two) times daily with meals.    multivitamin (THERAGRAN) per tablet Take 1 tablet by mouth once daily.    omeprazole (PRILOSEC) 20 MG capsule Take 20 mg by mouth once daily.    phenytoin (DILANTIN) 125 mg/5 mL suspension Take 8 mLs by mouth 2 (two) times a day.    polyethylene glycol (GLYCOLAX) 17 gram PwPk Take 17 g by mouth once daily.    sertraline (ZOLOFT) 100 MG tablet Take 100 mg by mouth once daily.    [DISCONTINUED] predniSONE (DELTASONE) 20 MG tablet Take 20 mg by mouth once daily.    [DISCONTINUED] vancomycin (VANCOCIN) 125 MG capsule Take 125 mg by mouth 4 (four) times daily.     Family History    None       Tobacco Use    Smoking status: Unknown If Ever Smoked    Smokeless tobacco: Never Used   Substance and Sexual Activity    Alcohol use: Not Currently    Drug use: Not Currently    Sexual activity: Not Currently     Review of Systems   Unable to perform ROS: Dementia   Objective:     Vital Signs (Most Recent):    Vital Signs (24h Range):  Temp:  [97.4 °F (36.3 °C)-98.3 °F (36.8 °C)] 98.3 °F (36.8 °C)  Pulse:  [62-94] 83  Resp:  [16-24] 22  SpO2:  [94 %-100 %] 100 %  BP: (121-161)/() 136/83        There is no height or weight on file to calculate BMI.    Physical Exam  Vitals and  nursing note reviewed.   Constitutional:       General: He is not in acute distress.     Appearance: He is ill-appearing. He is not toxic-appearing.   HENT:      Head: Normocephalic.      Right Ear: External ear normal.      Left Ear: External ear normal.      Mouth/Throat:      Mouth: Mucous membranes are moist.   Eyes:      General:         Right eye: No discharge.         Left eye: No discharge.      Conjunctiva/sclera: Conjunctivae normal.   Cardiovascular:      Rate and Rhythm: Normal rate and regular rhythm.      Pulses: Normal pulses.      Heart sounds: Normal heart sounds. No murmur heard.  Pulmonary:      Breath sounds: No wheezing or rales.   Chest:      Chest wall: No tenderness.   Abdominal:      General: Bowel sounds are normal. There is no distension.      Tenderness: There is no abdominal tenderness. There is no guarding.   Musculoskeletal:      Cervical back: Neck supple.      Right lower leg: No edema.      Left lower leg: No edema.   Skin:     General: Skin is warm.      Findings: Lesion present.   Neurological:      Mental Status: Mental status is at baseline.           Significant Labs: All pertinent labs within the past 24 hours have been reviewed.    Significant Imaging: I have reviewed all pertinent imaging results/findings within the past 24 hours.    Assessment/Plan:     * Aspiration pneumonia  - continue IV Vancomycin and Zosyn  - Pharmacy to dose Vanc      Sepsis  - no signs of shock currently  - BCs (pending)  - IV antibiotics      Deep vein thrombosis (DVT) of both lower extremities  - Heparin changed to Eliquis today      Elevated troponin  - cardiology consulted, recommend continuing medical management and patient is a poor candidate for invasive procedures      Hx of stroke associated with blood clotting tendency  - continue Eliquis      DM2 (diabetes mellitus, type 2)  - continue prior diabetes management        VTE Risk Mitigation (From admission, onward)    Jennifer LANIER  MD Rayo  Department of Hospital Medicine   [facility]

## 2022-06-22 PROBLEM — L89.893: Status: ACTIVE | Noted: 2022-06-22

## 2022-06-22 PROBLEM — L89.896 PRESSURE INJURY OF DEEP TISSUE OF RIGHT FOOT: Status: ACTIVE | Noted: 2022-06-22

## 2022-06-22 PROBLEM — L89.150 PRESSURE INJURY OF SACRAL REGION, UNSTAGEABLE: Status: ACTIVE | Noted: 2022-06-22

## 2022-06-22 PROBLEM — R68.89 SUSPECTED DEEP TISSUE INJURY OF UNKNOWN DEPTH OF HEEL: Status: ACTIVE | Noted: 2022-06-22

## 2022-06-22 PROBLEM — R78.81 BACTEREMIA: Status: ACTIVE | Noted: 2022-06-22

## 2022-06-22 PROBLEM — L89.616 PRESSURE INJURY OF DEEP TISSUE OF RIGHT HEEL: Status: ACTIVE | Noted: 2022-06-22

## 2022-06-22 PROBLEM — L89.156 PRESSURE INJURY OF DEEP TISSUE OF SACRAL REGION: Status: ACTIVE | Noted: 2022-06-22

## 2022-06-22 PROBLEM — R23.9 SUSPECTED PRESSURE INJURY OF DEEP TISSUE: Status: ACTIVE | Noted: 2022-06-22

## 2022-06-22 LAB
GLUCOSE SERPL-MCNC: 136 MG/DL (ref 70–105)
GLUCOSE SERPL-MCNC: 68 MG/DL (ref 70–105)
GLUCOSE SERPL-MCNC: 73 MG/DL (ref 70–105)
GLUCOSE SERPL-MCNC: 92 MG/DL (ref 70–105)
VANCOMYCIN TROUGH SERPL-MCNC: 15.9 ΜG/ML (ref 10–20)

## 2022-06-22 PROCEDURE — 99900035 HC TECH TIME PER 15 MIN (STAT)

## 2022-06-22 PROCEDURE — 99223 PR INITIAL HOSPITAL CARE,LEVL III: ICD-10-PCS | Mod: AI,GC,, | Performed by: FAMILY MEDICINE

## 2022-06-22 PROCEDURE — 25000242 PHARM REV CODE 250 ALT 637 W/ HCPCS: Performed by: FAMILY MEDICINE

## 2022-06-22 PROCEDURE — 80202 ASSAY OF VANCOMYCIN: CPT | Performed by: FAMILY MEDICINE

## 2022-06-22 PROCEDURE — C9399 UNCLASSIFIED DRUGS OR BIOLOG: HCPCS | Performed by: FAMILY MEDICINE

## 2022-06-22 PROCEDURE — 94640 AIRWAY INHALATION TREATMENT: CPT

## 2022-06-22 PROCEDURE — 99232 SBSQ HOSP IP/OBS MODERATE 35: CPT | Mod: ,,, | Performed by: NURSE PRACTITIONER

## 2022-06-22 PROCEDURE — 63600175 PHARM REV CODE 636 W HCPCS: Performed by: FAMILY MEDICINE

## 2022-06-22 PROCEDURE — 25000003 PHARM REV CODE 250: Performed by: FAMILY MEDICINE

## 2022-06-22 PROCEDURE — C9113 INJ PANTOPRAZOLE SODIUM, VIA: HCPCS | Performed by: FAMILY MEDICINE

## 2022-06-22 PROCEDURE — 99223 1ST HOSP IP/OBS HIGH 75: CPT | Mod: AI,GC,, | Performed by: FAMILY MEDICINE

## 2022-06-22 PROCEDURE — 94761 N-INVAS EAR/PLS OXIMETRY MLT: CPT

## 2022-06-22 PROCEDURE — 99232 PR SUBSEQUENT HOSPITAL CARE,LEVL II: ICD-10-PCS | Mod: ,,, | Performed by: NURSE PRACTITIONER

## 2022-06-22 PROCEDURE — 27000221 HC OXYGEN, UP TO 24 HOURS

## 2022-06-22 PROCEDURE — 11000001 HC ACUTE MED/SURG PRIVATE ROOM

## 2022-06-22 PROCEDURE — 82962 GLUCOSE BLOOD TEST: CPT

## 2022-06-22 RX ORDER — ACETAMINOPHEN 325 MG/1
325 TABLET ORAL EVERY 6 HOURS PRN
Status: DISCONTINUED | OUTPATIENT
Start: 2022-06-22 | End: 2022-06-27 | Stop reason: HOSPADM

## 2022-06-22 RX ORDER — AMLODIPINE BESYLATE 5 MG/1
5 TABLET ORAL DAILY
Status: DISCONTINUED | OUTPATIENT
Start: 2022-06-22 | End: 2022-06-27 | Stop reason: HOSPADM

## 2022-06-22 RX ORDER — SERTRALINE HYDROCHLORIDE 50 MG/1
100 TABLET, FILM COATED ORAL DAILY
Status: DISCONTINUED | OUTPATIENT
Start: 2022-06-22 | End: 2022-06-27 | Stop reason: HOSPADM

## 2022-06-22 RX ORDER — HYDROCORTISONE ACETATE 25 MG/1
25 SUPPOSITORY RECTAL EVERY 8 HOURS PRN
COMMUNITY

## 2022-06-22 RX ORDER — METFORMIN HYDROCHLORIDE 500 MG/1
500 TABLET ORAL 2 TIMES DAILY WITH MEALS
Status: DISCONTINUED | OUTPATIENT
Start: 2022-06-22 | End: 2022-06-27 | Stop reason: HOSPADM

## 2022-06-22 RX ORDER — ACETAMINOPHEN 325 MG/1
650 TABLET ORAL EVERY 8 HOURS PRN
Status: DISCONTINUED | OUTPATIENT
Start: 2022-06-22 | End: 2022-06-27 | Stop reason: HOSPADM

## 2022-06-22 RX ORDER — ATORVASTATIN CALCIUM 80 MG/1
80 TABLET, FILM COATED ORAL DAILY
Status: DISCONTINUED | OUTPATIENT
Start: 2022-06-22 | End: 2022-06-27 | Stop reason: HOSPADM

## 2022-06-22 RX ORDER — UREA 200 MG/G
1 CREAM TOPICAL DAILY PRN
Status: DISCONTINUED | OUTPATIENT
Start: 2022-06-22 | End: 2022-06-27 | Stop reason: HOSPADM

## 2022-06-22 RX ORDER — NAPROXEN SODIUM 220 MG/1
81 TABLET, FILM COATED ORAL DAILY
Status: DISCONTINUED | OUTPATIENT
Start: 2022-06-22 | End: 2022-06-27 | Stop reason: HOSPADM

## 2022-06-22 RX ORDER — POLYETHYLENE GLYCOL 3350 17 G/17G
17 POWDER, FOR SOLUTION ORAL DAILY
Status: DISCONTINUED | OUTPATIENT
Start: 2022-06-22 | End: 2022-06-27 | Stop reason: HOSPADM

## 2022-06-22 RX ORDER — PHENYTOIN 125 MG/5ML
200 SUSPENSION ORAL 2 TIMES DAILY
Status: DISCONTINUED | OUTPATIENT
Start: 2022-06-22 | End: 2022-06-27 | Stop reason: HOSPADM

## 2022-06-22 RX ORDER — METOPROLOL TARTRATE 25 MG/1
12.5 TABLET ORAL 2 TIMES DAILY
Status: DISCONTINUED | OUTPATIENT
Start: 2022-06-22 | End: 2022-06-27 | Stop reason: HOSPADM

## 2022-06-22 RX ORDER — DONEPEZIL HYDROCHLORIDE 5 MG/1
10 TABLET, FILM COATED ORAL NIGHTLY
Status: DISCONTINUED | OUTPATIENT
Start: 2022-06-22 | End: 2022-06-27 | Stop reason: HOSPADM

## 2022-06-22 RX ORDER — LEVETIRACETAM 100 MG/ML
1000 SOLUTION ORAL 2 TIMES DAILY
Status: DISCONTINUED | OUTPATIENT
Start: 2022-06-22 | End: 2022-06-27 | Stop reason: HOSPADM

## 2022-06-22 RX ADMIN — IPRATROPIUM BROMIDE AND ALBUTEROL SULFATE 3 ML: 2.5; .5 SOLUTION RESPIRATORY (INHALATION) at 08:06

## 2022-06-22 RX ADMIN — IPRATROPIUM BROMIDE AND ALBUTEROL SULFATE 3 ML: 2.5; .5 SOLUTION RESPIRATORY (INHALATION) at 01:06

## 2022-06-22 RX ADMIN — PIPERACILLIN SODIUM AND TAZOBACTAM SODIUM 4.5 G: 4; 500 INJECTION, POWDER, FOR SOLUTION INTRAVENOUS at 03:06

## 2022-06-22 RX ADMIN — POTASSIUM BICARBONATE 40 MEQ: 391 TABLET, EFFERVESCENT ORAL at 09:06

## 2022-06-22 RX ADMIN — DONEPEZIL HYDROCHLORIDE 10 MG: 5 TABLET ORAL at 09:06

## 2022-06-22 RX ADMIN — APIXABAN 5 MG: 5 TABLET, FILM COATED ORAL at 08:06

## 2022-06-22 RX ADMIN — INSULIN DETEMIR 10 UNITS: 100 INJECTION, SOLUTION SUBCUTANEOUS at 09:06

## 2022-06-22 RX ADMIN — LEVETIRACETAM 1000 MG: 100 SOLUTION ORAL at 09:06

## 2022-06-22 RX ADMIN — LEVETIRACETAM 1000 MG: 100 SOLUTION ORAL at 08:06

## 2022-06-22 RX ADMIN — IPRATROPIUM BROMIDE AND ALBUTEROL SULFATE 3 ML: 2.5; .5 SOLUTION RESPIRATORY (INHALATION) at 07:06

## 2022-06-22 RX ADMIN — DEXTROSE MONOHYDRATE 12.5 G: 25 INJECTION, SOLUTION INTRAVENOUS at 12:06

## 2022-06-22 RX ADMIN — PANTOPRAZOLE SODIUM 40 MG: 40 INJECTION, POWDER, LYOPHILIZED, FOR SOLUTION INTRAVENOUS at 09:06

## 2022-06-22 RX ADMIN — SERTRALINE HYDROCHLORIDE 100 MG: 50 TABLET ORAL at 08:06

## 2022-06-22 RX ADMIN — MUPIROCIN: 20 OINTMENT TOPICAL at 09:06

## 2022-06-22 RX ADMIN — ATORVASTATIN CALCIUM 80 MG: 80 TABLET, FILM COATED ORAL at 08:06

## 2022-06-22 RX ADMIN — METOPROLOL TARTRATE 12.5 MG: 25 TABLET, FILM COATED ORAL at 09:06

## 2022-06-22 RX ADMIN — METFORMIN HYDROCHLORIDE 500 MG: 500 TABLET, FILM COATED ORAL at 08:06

## 2022-06-22 RX ADMIN — METOPROLOL TARTRATE 12.5 MG: 25 TABLET, FILM COATED ORAL at 08:06

## 2022-06-22 RX ADMIN — VANCOMYCIN HYDROCHLORIDE 1500 MG: 5 INJECTION, POWDER, LYOPHILIZED, FOR SOLUTION INTRAVENOUS at 03:06

## 2022-06-22 RX ADMIN — ASPIRIN 81 MG 81 MG: 81 TABLET ORAL at 08:06

## 2022-06-22 RX ADMIN — PHENYTOIN 200 MG: 125 SUSPENSION ORAL at 08:06

## 2022-06-22 RX ADMIN — THERA TABS 1 TABLET: TAB at 08:06

## 2022-06-22 RX ADMIN — POTASSIUM BICARBONATE 40 MEQ: 391 TABLET, EFFERVESCENT ORAL at 08:06

## 2022-06-22 RX ADMIN — PIPERACILLIN SODIUM AND TAZOBACTAM SODIUM 4.5 G: 4; 500 INJECTION, POWDER, FOR SOLUTION INTRAVENOUS at 12:06

## 2022-06-22 RX ADMIN — POLYETHYLENE GLYCOL 3350 17 G: 17 POWDER, FOR SOLUTION ORAL at 08:06

## 2022-06-22 RX ADMIN — IPRATROPIUM BROMIDE AND ALBUTEROL SULFATE 3 ML: 2.5; .5 SOLUTION RESPIRATORY (INHALATION) at 12:06

## 2022-06-22 RX ADMIN — APIXABAN 5 MG: 5 TABLET, FILM COATED ORAL at 09:06

## 2022-06-22 RX ADMIN — MUPIROCIN: 20 OINTMENT TOPICAL at 04:06

## 2022-06-22 RX ADMIN — AMLODIPINE BESYLATE 5 MG: 5 TABLET ORAL at 08:06

## 2022-06-22 RX ADMIN — PIPERACILLIN SODIUM AND TAZOBACTAM SODIUM 4.5 G: 4; 500 INJECTION, POWDER, FOR SOLUTION INTRAVENOUS at 08:06

## 2022-06-22 RX ADMIN — PHENYTOIN 200 MG: 125 SUSPENSION ORAL at 09:06

## 2022-06-22 RX ADMIN — METFORMIN HYDROCHLORIDE 500 MG: 500 TABLET, FILM COATED ORAL at 06:06

## 2022-06-22 NOTE — ASSESSMENT & PLAN NOTE
Clean with vashe  Apply urea   Cover with 4x4s and paper tape  Change daily  Low air loss mattress  TAP system  Turn every 2 hours

## 2022-06-22 NOTE — PLAN OF CARE
Problem: Adult Inpatient Plan of Care  Goal: Plan of Care Review  Outcome: Ongoing, Progressing  Goal: Patient-Specific Goal (Individualized)  Outcome: Ongoing, Progressing  Goal: Absence of Hospital-Acquired Illness or Injury  Outcome: Ongoing, Progressing  Goal: Optimal Comfort and Wellbeing  Outcome: Ongoing, Progressing     Problem: Impaired Wound Healing  Goal: Optimal Wound Healing  Outcome: Ongoing, Progressing

## 2022-06-22 NOTE — ASSESSMENT & PLAN NOTE
- continue IV Vancomycin and Zosyn  - Pharmacy to dose Vanc      06/22/2022  -continue vancomycin and zosyn

## 2022-06-22 NOTE — PROGRESS NOTES
Pharmacokinetic Assessment Follow Up: IV Vancomycin    Vancomycin serum concentration assessment(s):    The trough level was drawn correctly and can be used to guide therapy at this time. The measurement is within the desired definitive target range of 15 to 20 mcg/mL.    Vancomycin Regimen Plan:    Continue regimen of Vancomycin 1500 mg IV every 24 hours with next serum trough concentration measured at 1430 on 6/26    Drug levels (last 3 results):  Recent Labs   Lab Result Units 06/22/22  1425   Vancomycin, Trough µg/mL 15.9       Pharmacy will continue to follow and monitor vancomycin.    Please contact pharmacy at extension [phone] for questions regarding this assessment.    Patient brief summary:  Mahesh Acuña is a 67 y.o. male initiated on antimicrobial therapy with IV Vancomycin for treatment of lower respiratory infection    The patient's current regimen is vanc 1500 mg IV q24h    Drug Allergies:   Review of patient's allergies indicates:  No Known Allergies    Actual Body Weight:   68.7 kg    Renal Function:   Estimated Creatinine Clearance: 107.2 mL/min (A) (based on SCr of 0.65 mg/dL (L)).,     Dialysis Method (if applicable):  N/A    CBC (last 72 hours):  Recent Labs   Lab Result Units 06/19/22  1859 06/20/22  0330 06/20/22  1123 06/21/22  0255   WBC K/uL 10.82 8.44 7.57 7.11   Hemoglobin g/dL 8.9* 8.2* 8.8* 8.6*   Hematocrit % 30.5* 27.5* 29.5* 28.2*   Platelet Count K/uL 157 154 148* 162   Lymphocytes % % 16.0* 16.8* 18.1* 16.5*   Monocytes % % 7.4* 8.4* 7.9* 7.2*   Eosinophils % % 0.3* 0.7* 1.7 2.1   Basophils % % 0.3 0.5 0.4 0.7       Metabolic Panel (last 72 hours):  Recent Labs   Lab Result Units 06/20/22  0330 06/20/22  1123 06/21/22  0255   Sodium mmol/L 150* 154* 149*   Potassium mmol/L 3.8 3.8 3.3*   Chloride mmol/L 116* 115* 113*   CO2 mmol/L 30 31 31   Glucose mg/dL 168* 148* 162*   BUN mg/dL 48* 43* 33*   Creatinine mg/dL 0.81 0.83 0.65*       Vancomycin Administrations:  vancomycin given in  the last 96 hours                     vancomycin (VANCOCIN) 1,500 mg in dextrose 5 % 250 mL IVPB (mg) 1,500 mg New Bag 06/22/22 1556    vancomycin (VANCOCIN) 1,500 mg in dextrose 5 % 250 mL IVPB (mg) 1,500 mg New Bag 06/21/22 1427     1,500 mg New Bag 06/20/22 1600     1,500 mg New Bag 06/19/22 1814                    Microbiologic Results:  Microbiology Results (last 7 days)       ** No results found for the last 168 hours. **

## 2022-06-22 NOTE — ASSESSMENT & PLAN NOTE
Clean with vashe  Apply sensicare periwound  Apply urea   Cover with 4x4s, cheryle, and paper tape  Change daily  Low air loss mattress  TAP system  Turn every 2 hours

## 2022-06-22 NOTE — PROGRESS NOTES
[facility]  Progress Note    Patient Name: Mahesh Acuña  MRN: [id number]  Patient Class: IP- Inpatient   Admission Date: 6/21/2022  Length of Stay: 1 days  Attending Physician: Umang De Leon Jr., MD  Primary Care Provider: Primary Doctor No        Subjective:     Principal Problem:Aspiration pneumonia        HPI:  Patient is a 68 yo male from [facility] that has a pmhx of CVA (nonverbal), left BKA, PVD, dysphagia with PEG tube that presented to [facility] with shortness of breath. He was admitted and treated for sepsis 2/2 aspiration pneumonia, elevated troponins, and bilateral LE DVTs. He is on IV Vancomycin and Zosyn that will need to be continued for a total of 10 days. He was on a heparin drip up until today, but has now been transitioned to Eliquis in order to treat DVTs. He will be discharged from [facility] and admitted to [facility] for continued antibiotics.      Overview/Hospital Course:  06/22/2022 Resting in bed. NRB  on at 15 liters.Chest is clear. Heart RRR. NA is 149. Potassium is 3.3. Continue abx for aspiration pneumonia. Blood culture shows gram positive cocci.       Interval History:   06/22/2022 Resting in bed. NRB  on at 15 liters.Chest is clear. Heart RRR. NA is 149. Potassium is 3.3. Continue abx for aspiration pneumonia. Blood culture shows gram positive cocci.   Review of Systems   Unable to perform ROS: Patient nonverbal   Objective:     Vital Signs (Most Recent):  Temp: 96.3 °F (35.7 °C) (06/22/22 0800)  Pulse: 90 (06/22/22 1306)  Resp: 14 (06/22/22 1306)  BP: 127/77 (06/22/22 0800)  SpO2: 98 % (06/22/22 1306) Vital Signs (24h Range):  Temp:  [96.3 °F (35.7 °C)-98.1 °F (36.7 °C)] 96.3 °F (35.7 °C)  Pulse:  [] 90  Resp:  [14-24] 14  SpO2:  [93 %-100 %] 98 %  BP: (105-152)/(66-99) 127/77     Weight: 68.7 kg (151 lb 7.3 oz)  Body mass index is 20.54 kg/m².  No intake or output data in the 24  hours ending 06/22/22 1415   Physical Exam  HENT:      Head: Normocephalic.   Cardiovascular:      Rate and Rhythm: Normal rate and regular rhythm.      Heart sounds: Normal heart sounds.   Pulmonary:      Breath sounds: Normal breath sounds.      Comments: NRB at 15 liters  Abdominal:      General: Bowel sounds are normal.      Palpations: Abdomen is soft.      Comments: Peg tube        Significant Labs: All pertinent labs within the past 24 hours have been reviewed.  Recent Lab Results  (Last 5 results in the past 24 hours)        06/22/22  1224   06/22/22  0442   06/22/22  0112   06/21/22  2134   06/21/22  1544        POC Glucose 92   73   136   134   155                              Significant Imaging: I have reviewed all pertinent imaging results/findings within the past 24 hours.      Assessment/Plan:      * Aspiration pneumonia  - continue IV Vancomycin and Zosyn  - Pharmacy to dose Vanc      06/22/2022  -continue vancomycin and zosyn       Deep vein thrombosis (DVT) of both lower extremities  - Heparin changed to Eliquis today      06/22/2022  -continue eliquiis BID       Elevated troponin  - cardiology consulted, recommend continuing medical management and patient is a poor candidate for invasive procedures      Hx of stroke associated with blood clotting tendency  - continue Eliquis      DM2 (diabetes mellitus, type 2)  - continue prior diabetes management    -06/22/2022  -accuchecks every 6 hours    Sepsis  - no signs of shock currently  - BCs (pending)  - IV antibiotics        VTE Risk Mitigation (From admission, onward)         Ordered     apixaban tablet 5 mg  2 times daily         06/22/22 0813     IP VTE LOW RISK PATIENT  Once         06/21/22 1855     Place sequential compression device  Until discontinued         06/21/22 1855                Discharge Planning   BYRON:      Code Status: Full Code   Is the patient medically ready for discharge?:     Reason for patient still in hospital (select all  that apply): Treatment                     MERLY Rivera  Department of Hospital Medicine   [facility]

## 2022-06-22 NOTE — PLAN OF CARE
[facility]  Initial Discharge Assessment       Primary Care Provider: Primary Doctor No    Admission Diagnosis: Aspiration pneumonia [J69.0]    Admission Date: 6/21/2022  Expected Discharge Date:     Discharge Barriers Identified: None    Payor: MEDICARE / Plan: MEDICARE PART A & B / Product Type: Government /     Extended Emergency Contact Information  Primary Emergency Contact: Geetha Acuña Phone: [phone]  Relation: Sister  Preferred language: English   needed? No    Discharge Plan A: Return to nursing home  Discharge Plan B: Return to Nursing Home    No Pharmacies Listed    Initial Assessment (most recent)     Adult Discharge Assessment - 06/22/22 1435        Discharge Assessment    Assessment Type Discharge Planning Assessment     Source of Information family     Communicated BYRON with patient/caregiver Date not available/Unable to determine     Lives With facility resident     Facility Arrived From: [facility]     Do you expect to return to your current living situation? Yes     Equipment Currently Used at Home --   per NH    Patient currently being followed by outpatient case management? No     Do you currently have service(s) that help you manage your care at home? No     Do you take prescription medications? Yes     Do you have prescription coverage? Yes     Do you have any problems affording any of your prescribed medications? No     Is the patient taking medications as prescribed? yes     Are you on dialysis? No     Discharge Plan A Return to nursing home     Discharge Plan B Return to Nursing Home     DME Needed Upon Discharge  none     Discharge Barriers Identified None                 SS spoke with pt's sister, Maria. Pt is a resident at [facility]. Pt will return when medically ready for discharge. Password set and is [facility]. and Informed sister of IDT meeting . SS will follow

## 2022-06-22 NOTE — SUBJECTIVE & OBJECTIVE
Interval History:   06/22/2022 Resting in bed. NRB  on at 15 liters.Chest is clear. Heart RRR. NA is 149. Potassium is 3.3. Continue abx for aspiration pneumonia. Blood culture shows gram positive cocci.   Review of Systems   Unable to perform ROS: Patient nonverbal   Objective:     Vital Signs (Most Recent):  Temp: 96.3 °F (35.7 °C) (06/22/22 0800)  Pulse: 90 (06/22/22 1306)  Resp: 14 (06/22/22 1306)  BP: 127/77 (06/22/22 0800)  SpO2: 98 % (06/22/22 1306) Vital Signs (24h Range):  Temp:  [96.3 °F (35.7 °C)-98.1 °F (36.7 °C)] 96.3 °F (35.7 °C)  Pulse:  [] 90  Resp:  [14-24] 14  SpO2:  [93 %-100 %] 98 %  BP: (105-152)/(66-99) 127/77     Weight: 68.7 kg (151 lb 7.3 oz)  Body mass index is 20.54 kg/m².  No intake or output data in the 24 hours ending 06/22/22 1415   Physical Exam  HENT:      Head: Normocephalic.   Cardiovascular:      Rate and Rhythm: Normal rate and regular rhythm.      Heart sounds: Normal heart sounds.   Pulmonary:      Breath sounds: Normal breath sounds.      Comments: NRB at 15 liters  Abdominal:      General: Bowel sounds are normal.      Palpations: Abdomen is soft.      Comments: Peg tube        Significant Labs: All pertinent labs within the past 24 hours have been reviewed.  Recent Lab Results  (Last 5 results in the past 24 hours)        06/22/22  1224   06/22/22  0442   06/22/22  0112   06/21/22  2134   06/21/22  1544        POC Glucose 92   73   136   134   155                              Significant Imaging: I have reviewed all pertinent imaging results/findings within the past 24 hours.

## 2022-06-22 NOTE — PLAN OF CARE
Problem: Diabetes Comorbidity  Goal: Blood Glucose Level Within Targeted Range  Outcome: Ongoing, Progressing     Problem: Adjustment to Illness (Sepsis/Septic Shock)  Goal: Optimal Coping  Outcome: Ongoing, Progressing     Problem: Nutrition Impaired (Sepsis/Septic Shock)  Goal: Optimal Nutrition Intake  Outcome: Ongoing, Progressing     Problem: Impaired Wound Healing  Goal: Optimal Wound Healing  Outcome: Ongoing, Progressing     Problem: Fall Injury Risk  Goal: Absence of Fall and Fall-Related Injury  Outcome: Ongoing, Progressing

## 2022-06-22 NOTE — HOSPITAL COURSE
06/22/2022 Resting in bed. NRB  on at 15 liters.Chest is clear. Heart RRR. NA is 149. Potassium is 3.3. Continue abx for aspiration pneumonia. Blood culture shows gram positive cocci.   06/27/2022 Awake and resting in bed. Nonverbal. Has been treated for aspiration pneumonia and will be able to discharge soon. Will talk with  re discharge today if possible , if not then tomorrow.  06/27 Has been accepted back to [facility]. Medication reconciliation done. Continue PEG feedings with aspiration precautions. Blood glucose checks every 6 hours and prn. Continue augmentin for another 4 days. Continue wound care per [facility] care orders. Follow up with PCP in one week.

## 2022-06-22 NOTE — PROGRESS NOTES
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 He is a resident at [facility]. He was admitted on 6/19 to [facility] due to aspiration pneumonia, sepsis, and DVT of BLE. Significant PMH CVA, left BKA, PVD, diabetes, DVTs, CAD, and dysphagia. He is non-verbal and mobility is severely impaired. He is high risk for skin breakdown due to hypoperfusion and immobility. He is oxygen dependant.        Review of Systems   Unable to perform ROS: Patient nonverbal     Objective:     Vital Signs (Most Recent):  Temp: 96.3 °F (35.7 °C) (06/22/22 0800)  Pulse: 90 (06/22/22 1306)  Resp: 14 (06/22/22 1306)  BP: 127/77 (06/22/22 0800)  SpO2: 99 % (06/22/22 1604) Vital Signs (24h Range):  Temp:  [96.3 °F (35.7 °C)-98.1 °F (36.7 °C)] 96.3 °F (35.7 °C)  Pulse:  [] 90  Resp:  [14-24] 14  SpO2:  [93 %-100 %] 99 %  BP: (118-152)/(69-99) 127/77     Weight: 68.7 kg (151 lb 7.3 oz)  Body mass index is 20.54 kg/m².  No data recorded    Physical Exam  Vitals reviewed.   Constitutional:       Appearance: He is ill-appearing.   HENT:      Head: Normocephalic.   Cardiovascular:      Rate and Rhythm: Tachycardia present. Rhythm irregular.   Pulmonary:      Effort: Pulmonary effort is normal.   Musculoskeletal:         General: Swelling and deformity present.      Right lower leg: Edema present.      Left lower leg: Edema present.   Skin:     Findings: Erythema present.      Comments: Wound, see LDA for photos and measurements   Neurological:       Mental Status: Mental status is at baseline.      Motor: Weakness present.      Gait: Gait abnormal.               Wound 06/19/22 2136 Other (comment) Left anterior;lower;proximal Leg (Active)   06/19/22 2136    Pre-existing: Yes   Primary Wound Type: Other   Side: Left   Orientation: anterior;lower;proximal   Location: Leg   Wound Number:    Ankle-Brachial Index:    Pulses:    Removal Indication and Assessment:    Wound Outcome:    (Retired) Wound Type:    (Retired) Wound Length (cm):    (Retired) Wound Width (cm):    (Retired) Depth (cm):    Wound Description (Comments):    Removal Indications:    Wound Image   06/21/22 1200   Dressing Appearance Open to air 06/21/22 1200   Drainage Amount Moderate 06/21/22 1200   Drainage Characteristics/Odor Purulent;Malodorous 06/20/22 1910   Appearance Yellow 06/21/22 1200   Tissue loss description Full thickness 06/21/22 1200   Red (%), Wound Tissue Color 50 % 06/21/22 1200   Yellow (%), Wound Tissue Color 50 % 06/21/22 1200   Periwound Area Intact 06/21/22 1200   Wound Edges Defined;Irregular;Open 06/21/22 1200   Wound Length (cm) 3.2 cm 06/21/22 1200   Wound Width (cm) 4.1 cm 06/21/22 1200   Wound Depth (cm) 0.3 cm 06/21/22 1200   Wound Volume (cm^3) 3.936 cm^3 06/21/22 1200   Wound Surface Area (cm^2) 13.12 cm^2 06/21/22 1200   Care Cleansed with:;Wound cleanser 06/21/22 1200   Dressing Applied;Other (comment);Non-adherent;Foam 06/21/22 1200   Packing other (see comment) 06/21/22 1200   Number of days: 3         Assessment and Plan      Pressure injury of deep tissue of right heel  Clean with vashe  Protect with foam border  Change M, W, F and PRN  Assess daily    Pressure injury of deep tissue of right foot  Clean with vashe  Protect with foam border  Change M, W, F and PRN  Assess daily    Pressure injury of left leg, stage 3  Clean with vashe  Apply sensicare periwound  Apply urea   Cover with 4x4s, cheryle, and paper tape  Change daily  Low air loss mattress  TAP  system  Turn every 2 hours    Pressure injury of deep tissue of sacral region  Clean with vashe  Apply urea   Cover with 4x4s and paper tape  Change daily  Low air loss mattress  TAP system  Turn every 2 hours            Signature:  MERLY Calderón  Wound Care    Date of encounter: 06/22/2022

## 2022-06-23 LAB
BACTERIA BLD CULT: ABNORMAL
GLUCOSE SERPL-MCNC: 114 MG/DL (ref 70–105)
GLUCOSE SERPL-MCNC: 65 MG/DL (ref 70–105)
GLUCOSE SERPL-MCNC: 66 MG/DL (ref 70–105)
GLUCOSE SERPL-MCNC: 77 MG/DL (ref 70–105)
GRAM STN SPEC: ABNORMAL

## 2022-06-23 PROCEDURE — 27000221 HC OXYGEN, UP TO 24 HOURS

## 2022-06-23 PROCEDURE — 63600175 PHARM REV CODE 636 W HCPCS: Performed by: FAMILY MEDICINE

## 2022-06-23 PROCEDURE — 99232 SBSQ HOSP IP/OBS MODERATE 35: CPT | Mod: ,,, | Performed by: FAMILY MEDICINE

## 2022-06-23 PROCEDURE — 25000003 PHARM REV CODE 250: Performed by: FAMILY MEDICINE

## 2022-06-23 PROCEDURE — 25000242 PHARM REV CODE 250 ALT 637 W/ HCPCS: Performed by: FAMILY MEDICINE

## 2022-06-23 PROCEDURE — 99232 PR SUBSEQUENT HOSPITAL CARE,LEVL II: ICD-10-PCS | Mod: ,,, | Performed by: FAMILY MEDICINE

## 2022-06-23 PROCEDURE — 94640 AIRWAY INHALATION TREATMENT: CPT

## 2022-06-23 PROCEDURE — 25000003 PHARM REV CODE 250: Performed by: NURSE PRACTITIONER

## 2022-06-23 PROCEDURE — 11000001 HC ACUTE MED/SURG PRIVATE ROOM

## 2022-06-23 PROCEDURE — 82962 GLUCOSE BLOOD TEST: CPT

## 2022-06-23 PROCEDURE — 94761 N-INVAS EAR/PLS OXIMETRY MLT: CPT

## 2022-06-23 PROCEDURE — C9113 INJ PANTOPRAZOLE SODIUM, VIA: HCPCS | Performed by: FAMILY MEDICINE

## 2022-06-23 RX ADMIN — METFORMIN HYDROCHLORIDE 500 MG: 500 TABLET, FILM COATED ORAL at 07:06

## 2022-06-23 RX ADMIN — METOPROLOL TARTRATE 12.5 MG: 25 TABLET, FILM COATED ORAL at 09:06

## 2022-06-23 RX ADMIN — LEVETIRACETAM 1000 MG: 100 SOLUTION ORAL at 09:06

## 2022-06-23 RX ADMIN — IPRATROPIUM BROMIDE AND ALBUTEROL SULFATE 3 ML: 2.5; .5 SOLUTION RESPIRATORY (INHALATION) at 01:06

## 2022-06-23 RX ADMIN — MUPIROCIN: 20 OINTMENT TOPICAL at 09:06

## 2022-06-23 RX ADMIN — PIPERACILLIN SODIUM AND TAZOBACTAM SODIUM 4.5 G: 4; 500 INJECTION, POWDER, FOR SOLUTION INTRAVENOUS at 04:06

## 2022-06-23 RX ADMIN — PHENYTOIN 200 MG: 125 SUSPENSION ORAL at 09:06

## 2022-06-23 RX ADMIN — VANCOMYCIN HYDROCHLORIDE 1500 MG: 5 INJECTION, POWDER, LYOPHILIZED, FOR SOLUTION INTRAVENOUS at 03:06

## 2022-06-23 RX ADMIN — IPRATROPIUM BROMIDE AND ALBUTEROL SULFATE 3 ML: 2.5; .5 SOLUTION RESPIRATORY (INHALATION) at 07:06

## 2022-06-23 RX ADMIN — PHENYTOIN 200 MG: 125 SUSPENSION ORAL at 08:06

## 2022-06-23 RX ADMIN — DONEPEZIL HYDROCHLORIDE 10 MG: 5 TABLET ORAL at 09:06

## 2022-06-23 RX ADMIN — METOPROLOL TARTRATE 12.5 MG: 25 TABLET, FILM COATED ORAL at 08:06

## 2022-06-23 RX ADMIN — ASPIRIN 81 MG 81 MG: 81 TABLET ORAL at 08:06

## 2022-06-23 RX ADMIN — POTASSIUM BICARBONATE 40 MEQ: 391 TABLET, EFFERVESCENT ORAL at 08:06

## 2022-06-23 RX ADMIN — Medication 1 APPLICATION: at 04:06

## 2022-06-23 RX ADMIN — AMLODIPINE BESYLATE 5 MG: 5 TABLET ORAL at 08:06

## 2022-06-23 RX ADMIN — POTASSIUM BICARBONATE 40 MEQ: 391 TABLET, EFFERVESCENT ORAL at 09:06

## 2022-06-23 RX ADMIN — IPRATROPIUM BROMIDE AND ALBUTEROL SULFATE 3 ML: 2.5; .5 SOLUTION RESPIRATORY (INHALATION) at 12:06

## 2022-06-23 RX ADMIN — LEVETIRACETAM 1000 MG: 100 SOLUTION ORAL at 08:06

## 2022-06-23 RX ADMIN — APIXABAN 5 MG: 5 TABLET, FILM COATED ORAL at 08:06

## 2022-06-23 RX ADMIN — PIPERACILLIN SODIUM AND TAZOBACTAM SODIUM 4.5 G: 4; 500 INJECTION, POWDER, FOR SOLUTION INTRAVENOUS at 08:06

## 2022-06-23 RX ADMIN — PIPERACILLIN SODIUM AND TAZOBACTAM SODIUM 4.5 G: 4; 500 INJECTION, POWDER, FOR SOLUTION INTRAVENOUS at 11:06

## 2022-06-23 RX ADMIN — APIXABAN 5 MG: 5 TABLET, FILM COATED ORAL at 09:06

## 2022-06-23 RX ADMIN — MUPIROCIN: 20 OINTMENT TOPICAL at 10:06

## 2022-06-23 RX ADMIN — SERTRALINE HYDROCHLORIDE 100 MG: 50 TABLET ORAL at 08:06

## 2022-06-23 RX ADMIN — THERA TABS 1 TABLET: TAB at 08:06

## 2022-06-23 RX ADMIN — ATORVASTATIN CALCIUM 80 MG: 80 TABLET, FILM COATED ORAL at 08:06

## 2022-06-23 RX ADMIN — METFORMIN HYDROCHLORIDE 500 MG: 500 TABLET, FILM COATED ORAL at 05:06

## 2022-06-23 RX ADMIN — PANTOPRAZOLE SODIUM 40 MG: 40 INJECTION, POWDER, LYOPHILIZED, FOR SOLUTION INTRAVENOUS at 09:06

## 2022-06-23 NOTE — SUBJECTIVE & OBJECTIVE
Interval History: Non verbal. Makes eye contact. More alert over the past few days.     Review of Systems   Unable to perform ROS: Patient nonverbal   Objective:     Vital Signs (Most Recent):  Temp: 98.2 °F (36.8 °C) (06/23/22 0400)  Pulse: 82 (06/23/22 0800)  Resp: 13 (06/23/22 0715)  BP: 114/66 (06/23/22 0800)  SpO2: 99 % (06/23/22 0715) Vital Signs (24h Range):  Temp:  [96.7 °F (35.9 °C)-98.2 °F (36.8 °C)] 98.2 °F (36.8 °C)  Pulse:  [71-93] 82  Resp:  [13-20] 13  SpO2:  [98 %-100 %] 99 %  BP: (108-137)/(48-83) 114/66     Weight: 68.7 kg (151 lb 7.3 oz)  Body mass index is 20.54 kg/m².  No intake or output data in the 24 hours ending 06/23/22 0818   Physical Exam  Vitals reviewed.   Constitutional:       Appearance: Normal appearance.   Eyes:      General: No scleral icterus.     Pupils: Pupils are equal, round, and reactive to light.   Cardiovascular:      Rate and Rhythm: Normal rate and regular rhythm.      Heart sounds: Normal heart sounds.   Pulmonary:      Effort: Pulmonary effort is normal. No respiratory distress.      Breath sounds: Rhonchi present. No wheezing.   Abdominal:      General: Abdomen is flat. There is no distension.      Palpations: Abdomen is soft.      Tenderness: There is no abdominal tenderness.   Skin:     General: Skin is warm and dry.   Neurological:      Mental Status: He is alert.       Significant Labs: All pertinent labs within the past 24 hours have been reviewed.  BMP: No results for input(s): GLU, NA, K, CL, CO2, BUN, CREATININE, CALCIUM, MG in the last 48 hours.  CBC: No results for input(s): WBC, HGB, HCT, PLT in the last 48 hours.    Significant Imaging: I have reviewed all pertinent imaging results/findings within the past 24 hours.

## 2022-06-23 NOTE — ASSESSMENT & PLAN NOTE
- continue prior diabetes management    -06/22/2022  -accuchecks every 6 hours    6/23 - glucoses a little low. Will d/c lantus and see how he does with metformin alone.

## 2022-06-23 NOTE — PLAN OF CARE
Problem: Adult Inpatient Plan of Care  Goal: Plan of Care Review  Outcome: Ongoing, Progressing  Goal: Patient-Specific Goal (Individualized)  Outcome: Ongoing, Progressing  Goal: Absence of Hospital-Acquired Illness or Injury  Outcome: Ongoing, Progressing  Goal: Optimal Comfort and Wellbeing  Outcome: Ongoing, Progressing  Goal: Readiness for Transition of Care  Outcome: Ongoing, Progressing     Problem: Diabetes Comorbidity  Goal: Blood Glucose Level Within Targeted Range  Outcome: Ongoing, Progressing     Problem: Adjustment to Illness (Sepsis/Septic Shock)  Goal: Optimal Coping  Outcome: Ongoing, Progressing     Problem: Bleeding (Sepsis/Septic Shock)  Goal: Absence of Bleeding  Outcome: Ongoing, Progressing     Problem: Infection Progression (Sepsis/Septic Shock)  Goal: Absence of Infection Signs and Symptoms  Outcome: Ongoing, Progressing     Problem: Nutrition Impaired (Sepsis/Septic Shock)  Goal: Optimal Nutrition Intake  Outcome: Ongoing, Progressing     Problem: Impaired Wound Healing  Goal: Optimal Wound Healing  Outcome: Ongoing, Progressing     Problem: Fall Injury Risk  Goal: Absence of Fall and Fall-Related Injury  Outcome: Ongoing, Progressing

## 2022-06-23 NOTE — PROGRESS NOTES
[facility] Hospital Medicine  Progress Note    Patient Name: Mahesh Acuña  MRN: [id number]  Patient Class: IP- Inpatient   Admission Date: 6/21/2022  Length of Stay: 2 days  Attending Physician: Umang De Leon Jr., MD  Primary Care Provider: Primary Doctor No        Subjective:     Principal Problem:Aspiration pneumonia        HPI:  Patient is a 66 yo male from [facility] that has a pmhx of CVA (nonverbal), left BKA, PVD, dysphagia with PEG tube that presented to [facility] with shortness of breath. He was admitted and treated for sepsis 2/2 aspiration pneumonia, elevated troponins, and bilateral LE DVTs. He is on IV Vancomycin and Zosyn that will need to be continued for a total of 10 days. He was on a heparin drip up until today, but has now been transitioned to Eliquis in order to treat DVTs. He will be discharged from [facility] and admitted to [facility] for continued antibiotics.      Overview/Hospital Course:  06/22/2022 Resting in bed. NRB  on at 15 liters.Chest is clear. Heart RRR. NA is 149. Potassium is 3.3. Continue abx for aspiration pneumonia. Blood culture shows gram positive cocci.       Interval History: Non verbal. Makes eye contact. More alert over the past few days.     Review of Systems   Unable to perform ROS: Patient nonverbal   Objective:     Vital Signs (Most Recent):  Temp: 98.2 °F (36.8 °C) (06/23/22 0400)  Pulse: 82 (06/23/22 0800)  Resp: 13 (06/23/22 0715)  BP: 114/66 (06/23/22 0800)  SpO2: 99 % (06/23/22 0715) Vital Signs (24h Range):  Temp:  [96.7 °F (35.9 °C)-98.2 °F (36.8 °C)] 98.2 °F (36.8 °C)  Pulse:  [71-93] 82  Resp:  [13-20] 13  SpO2:  [98 %-100 %] 99 %  BP: (108-137)/(48-83) 114/66     Weight: 68.7 kg (151 lb 7.3 oz)  Body mass index is 20.54 kg/m².  No intake or output data in the 24 hours ending 06/23/22 0818   Physical Exam  Vitals reviewed.   Constitutional:       Appearance: Normal appearance.    Eyes:      General: No scleral icterus.     Pupils: Pupils are equal, round, and reactive to light.   Cardiovascular:      Rate and Rhythm: Normal rate and regular rhythm.      Heart sounds: Normal heart sounds.   Pulmonary:      Effort: Pulmonary effort is normal. No respiratory distress.      Breath sounds: Rhonchi present. No wheezing.   Abdominal:      General: Abdomen is flat. There is no distension.      Palpations: Abdomen is soft.      Tenderness: There is no abdominal tenderness.   Skin:     General: Skin is warm and dry.   Neurological:      Mental Status: He is alert.       Significant Labs: All pertinent labs within the past 24 hours have been reviewed.  BMP: No results for input(s): GLU, NA, K, CL, CO2, BUN, CREATININE, CALCIUM, MG in the last 48 hours.  CBC: No results for input(s): WBC, HGB, HCT, PLT in the last 48 hours.    Significant Imaging: I have reviewed all pertinent imaging results/findings within the past 24 hours.      Assessment/Plan:      * Aspiration pneumonia  - continue IV Vancomycin and Zosyn  - Pharmacy to dose Vanc      06/22/2022  -continue vancomycin and zosyn     6/23  -improving. Continue abx.       Deep vein thrombosis (DVT) of both lower extremities  - Heparin changed to Eliquis today      06/22/2022  -continue eliquiis BID       Elevated troponin  - cardiology consulted, recommend continuing medical management and patient is a poor candidate for invasive procedures      Hx of stroke associated with blood clotting tendency  - continue Eliquis      DM2 (diabetes mellitus, type 2)  - continue prior diabetes management    -06/22/2022  -accuchecks every 6 hours    6/23 - glucoses a little low. Will d/c lantus and see how he does with metformin alone.     Sepsis  - no signs of shock currently  - BCs (pending)  - IV antibiotics    Positive BCx likely a contaminant.         VTE Risk Mitigation (From admission, onward)         Ordered     apixaban tablet 5 mg  2 times daily          06/22/22 0813     IP VTE LOW RISK PATIENT  Once         06/21/22 1855     Place sequential compression device  Until discontinued         06/21/22 1855                Discharge Planning   BYRON:      Code Status: Full Code   Is the patient medically ready for discharge?:     Reason for patient still in hospital (select all that apply): Treatment  Discharge Plan A: Return to nursing home                  Umang De Leon Jr, MD  Department of Hospital Medicine   [facility]

## 2022-06-23 NOTE — ASSESSMENT & PLAN NOTE
- no signs of shock currently  - BCs (pending)  - IV antibiotics    Positive BCx likely a contaminant.

## 2022-06-23 NOTE — CONSULTS
[facility]  Adult Nutrition  Consult Note    SUMMARY     Recommendations    Recommendation/Intervention: Recommend: Glucerna 1.5 with goal rate @ 50 mL/hr + FWF @ 90 mL Q2hrs. RD also recommends adding Saw BID to aid in wound healing. Glucerna 1.5 @ goal rate of 50 mL/hr + FWF @ 90 mL Q2hrs + Saw BID will provide: 1980 kcals (100% needs), 104 g PRO (100% needs), 1928 mL fluids.  Goals: Tolerate TF @ goal rate  Nutrition Goal Status: new    Assessment and Plan  Consult received and appreciated. Pt with PEG and previous use per chart review. Recommend initiating Glucerna 1.5 slowly @ 20 mL/hr and increase by 10 mL Q6-8 hrs as tolerated until goal rate of 50 mL/hr is achieved. FWF @ 90 mL Q2hrs to meet fluid goals. Keep HOB at 30-45 degrees. Monitor for n/v/diarrhea, gas, bloating, distention, pain in abdominal region; should these occur, hold TF and consult RD.   No new Assessment & Plan notes have been filed under this hospital service since the last note was generated.  Service: Nutrition     Reason for Assessment    Reason For Assessment: consult  Diagnosis: other (see comments) (aspiration pneumonia)  Relevant Medical History: DM, CAD, seizures, depression, dementia, stroke, PVD, GERD, left BKA    Nutrition Risk Screen    Nutrition Risk Screen: tube feeding or parenteral nutrition    Anthropometrics    Temp: 98.2 °F (36.8 °C)  Height: 6' (182.9 cm)  Height (inches): 72 in  Weight Method: Bed Scale  Weight: 68.7 kg (151 lb 7.3 oz)  Weight (lb): 151.46 lb  Ideal Body Weight (IBW), Male: 178 lb  % Ideal Body Weight, Male (lb): 85.09 %  BMI (Calculated): 20.5  Weight Loss Since Admission: 13 lb  % Weight Change Since Admission: 8.58  Amputation %: 5.9  Total Amputation %: 5.9  Amputation Ideal Body Weight (IBW), Male (lb): 172.1 lb  Amputee BMI (kg/m2): 21.89 kg/m2       Lab/Procedures/Meds    Pertinent Labs Reviewed: reviewed  Pertinent Medications Reviewed: reviewed  Pertinent Medications  Comments: amlodipine, apixaban, aspirin, atorvastatin, donepezil, insulin, levetiracetam, metformin, metoprolol, mvi, pantoprazole, dilantin, zosyn, polyethylene glycol, potassium bicarbonate, sertraline, vancocin    Estimated/Assessed Needs    Weight Used For Calorie Calculations: 78 kg (171 lb 15.3 oz)  Energy Calorie Requirements (kcal): 3516-8320 kcals/day (25-30 kcals/kg adj. BW)  Energy Need Method: Kcal/kg  Protein Requirements:  g/day  Weight Used For Protein Calculations: 78 kg (171 lb 15.3 oz)  Fluid Requirements (mL): 8326-9300 mL/day     RDA Method (mL): 1950         Nutrition Prescription Ordered    Current Diet Order: NPO + PEG feeding  Current Nutrition Support Formula Ordered: Glucerna 1.5  Current Nutrition Support Rate Ordered: 50 (ml)    Evaluation of Received Nutrient/Fluid Intake    Enteral Calories (kcal): 1800  Enteral Protein (gm): 99  Enteral (Free Water) Fluid (mL): 848  Free Water Flush Fluid (mL): 720  % Kcal Needs: 92  % Protein Needs: 100  % Intake of Estimated Energy Needs: 75 - 100 %  % Meal Intake: NPO    Nutrition Risk    Level of Risk/Frequency of Follow-up: moderate       Monitor and Evaluation      1. Monitor TF adequacy/tolerance  2. Monitor weight changes  3. Monitor labs/meds  4. Monitor wound healing   5. Monitor POC     Nutrition Follow-Up    RD Follow-up?: Yes

## 2022-06-23 NOTE — NURSING
Wound care note:  68yo male [facility] with diagnosis of aspiration pneumonia. Assessed by A MERLY Oliveira. SDTI sacral wound noted and a stage 3 pressure to left lateal leg, applying urea cream to both. SDTI noted to right lateral foot and heel.

## 2022-06-23 NOTE — PLAN OF CARE
Problem: Adult Inpatient Plan of Care  Goal: Plan of Care Review  Outcome: Ongoing, Progressing  Goal: Patient-Specific Goal (Individualized)  Outcome: Ongoing, Progressing  Goal: Absence of Hospital-Acquired Illness or Injury  Outcome: Ongoing, Progressing  Goal: Optimal Comfort and Wellbeing  Outcome: Ongoing, Progressing  Goal: Readiness for Transition of Care  Outcome: Ongoing, Progressing     Problem: Diabetes Comorbidity  Goal: Blood Glucose Level Within Targeted Range  Outcome: Ongoing, Progressing     Problem: Adjustment to Illness (Sepsis/Septic Shock)  Goal: Optimal Coping  Outcome: Ongoing, Progressing     Problem: Bleeding (Sepsis/Septic Shock)  Goal: Absence of Bleeding  Outcome: Ongoing, Progressing     Problem: Glycemic Control Impaired (Sepsis/Septic Shock)  Goal: Blood Glucose Level Within Desired Range  Outcome: Ongoing, Progressing     Problem: Infection Progression (Sepsis/Septic Shock)  Goal: Absence of Infection Signs and Symptoms  Outcome: Ongoing, Progressing     Problem: Nutrition Impaired (Sepsis/Septic Shock)  Goal: Optimal Nutrition Intake  Outcome: Ongoing, Progressing     Problem: Impaired Wound Healing  Goal: Optimal Wound Healing  Outcome: Ongoing, Progressing     Problem: Fall Injury Risk  Goal: Absence of Fall and Fall-Related Injury  Outcome: Ongoing, Progressing     Problem: Skin Injury Risk Increased  Goal: Skin Health and Integrity  Outcome: Ongoing, Progressing

## 2022-06-23 NOTE — PLAN OF CARE
Problem: Adult Inpatient Plan of Care  Goal: Plan of Care Review  6/23/2022 0552 by Janet Trujillo RN  Outcome: Ongoing, Progressing  6/23/2022 0547 by Janet Trujillo RN  Outcome: Ongoing, Progressing  Goal: Patient-Specific Goal (Individualized)  6/23/2022 0552 by Janet Trujillo RN  Outcome: Ongoing, Progressing  6/23/2022 0547 by Janet Trujillo RN  Outcome: Ongoing, Progressing  Goal: Absence of Hospital-Acquired Illness or Injury  6/23/2022 0552 by Janet Trujillo RN  Outcome: Ongoing, Progressing  6/23/2022 0547 by Janet Trujillo RN  Outcome: Ongoing, Progressing  Goal: Optimal Comfort and Wellbeing  6/23/2022 0552 by Janet Trujillo RN  Outcome: Ongoing, Progressing  6/23/2022 0547 by Janet Trujillo RN  Outcome: Ongoing, Progressing  Goal: Readiness for Transition of Care  6/23/2022 0552 by Janet Trujilol RN  Outcome: Ongoing, Progressing  6/23/2022 0547 by Janet Trujillo RN  Outcome: Ongoing, Progressing     Problem: Diabetes Comorbidity  Goal: Blood Glucose Level Within Targeted Range  6/23/2022 0552 by Janet Trujillo RN  Outcome: Ongoing, Progressing  6/23/2022 0547 by Janet Trujillo RN  Outcome: Ongoing, Progressing     Problem: Adjustment to Illness (Sepsis/Septic Shock)  Goal: Optimal Coping  6/23/2022 0552 by Janet Trujillo RN  Outcome: Ongoing, Progressing  6/23/2022 0547 by Janet Trujillo RN  Outcome: Ongoing, Progressing     Problem: Bleeding (Sepsis/Septic Shock)  Goal: Absence of Bleeding  6/23/2022 0552 by Janet Trujillo RN  Outcome: Ongoing, Progressing  6/23/2022 0547 by Janet Trujillo RN  Outcome: Ongoing, Progressing     Problem: Infection Progression (Sepsis/Septic Shock)  Goal: Absence of Infection Signs and Symptoms  6/23/2022 0552 by Janet Trujillo RN  Outcome: Ongoing, Progressing  6/23/2022 0547 by  Janet Trujillo RN  Outcome: Ongoing, Progressing     Problem: Nutrition Impaired (Sepsis/Septic Shock)  Goal: Optimal Nutrition Intake  6/23/2022 0552 by Janet Trujillo RN  Outcome: Ongoing, Progressing  6/23/2022 0547 by Janet Trujillo RN  Outcome: Ongoing, Progressing     Problem: Impaired Wound Healing  Goal: Optimal Wound Healing  6/23/2022 0552 by Janet Trujillo RN  Outcome: Ongoing, Progressing  6/23/2022 0547 by Janet Trujillo RN  Outcome: Ongoing, Progressing     Problem: Fall Injury Risk  Goal: Absence of Fall and Fall-Related Injury  6/23/2022 0552 by Janet Trujillo RN  Outcome: Ongoing, Progressing  6/23/2022 0547 by Janet Trujillo RN  Outcome: Ongoing, Progressing

## 2022-06-23 NOTE — ASSESSMENT & PLAN NOTE
- continue IV Vancomycin and Zosyn  - Pharmacy to dose Vanc      06/22/2022  -continue vancomycin and zosyn     6/23  -improving. Continue abx.

## 2022-06-24 LAB
GLUCOSE SERPL-MCNC: 119 MG/DL (ref 70–105)
GLUCOSE SERPL-MCNC: 128 MG/DL (ref 70–105)
GLUCOSE SERPL-MCNC: 131 MG/DL (ref 70–105)
GLUCOSE SERPL-MCNC: 160 MG/DL (ref 70–105)
GLUCOSE SERPL-MCNC: 204 MG/DL (ref 70–105)
GLUCOSE SERPL-MCNC: 55 MG/DL (ref 70–105)

## 2022-06-24 PROCEDURE — 63600175 PHARM REV CODE 636 W HCPCS: Performed by: FAMILY MEDICINE

## 2022-06-24 PROCEDURE — 25000242 PHARM REV CODE 250 ALT 637 W/ HCPCS: Performed by: FAMILY MEDICINE

## 2022-06-24 PROCEDURE — 94640 AIRWAY INHALATION TREATMENT: CPT

## 2022-06-24 PROCEDURE — 82962 GLUCOSE BLOOD TEST: CPT

## 2022-06-24 PROCEDURE — 99232 PR SUBSEQUENT HOSPITAL CARE,LEVL II: ICD-10-PCS | Mod: ,,, | Performed by: FAMILY MEDICINE

## 2022-06-24 PROCEDURE — 94761 N-INVAS EAR/PLS OXIMETRY MLT: CPT

## 2022-06-24 PROCEDURE — 99232 SBSQ HOSP IP/OBS MODERATE 35: CPT | Mod: ,,, | Performed by: FAMILY MEDICINE

## 2022-06-24 PROCEDURE — 11000001 HC ACUTE MED/SURG PRIVATE ROOM

## 2022-06-24 PROCEDURE — 25000003 PHARM REV CODE 250: Performed by: FAMILY MEDICINE

## 2022-06-24 PROCEDURE — C9113 INJ PANTOPRAZOLE SODIUM, VIA: HCPCS | Performed by: FAMILY MEDICINE

## 2022-06-24 PROCEDURE — 27000221 HC OXYGEN, UP TO 24 HOURS

## 2022-06-24 RX ORDER — METOCLOPRAMIDE 5 MG/1
5 TABLET ORAL EVERY 12 HOURS
Status: DISCONTINUED | OUTPATIENT
Start: 2022-06-24 | End: 2022-06-27 | Stop reason: HOSPADM

## 2022-06-24 RX ADMIN — IPRATROPIUM BROMIDE AND ALBUTEROL SULFATE 3 ML: 2.5; .5 SOLUTION RESPIRATORY (INHALATION) at 07:06

## 2022-06-24 RX ADMIN — APIXABAN 5 MG: 5 TABLET, FILM COATED ORAL at 08:06

## 2022-06-24 RX ADMIN — PIPERACILLIN SODIUM AND TAZOBACTAM SODIUM 4.5 G: 4; 500 INJECTION, POWDER, FOR SOLUTION INTRAVENOUS at 04:06

## 2022-06-24 RX ADMIN — METOPROLOL TARTRATE 12.5 MG: 25 TABLET, FILM COATED ORAL at 08:06

## 2022-06-24 RX ADMIN — IPRATROPIUM BROMIDE AND ALBUTEROL SULFATE 3 ML: 2.5; .5 SOLUTION RESPIRATORY (INHALATION) at 12:06

## 2022-06-24 RX ADMIN — METFORMIN HYDROCHLORIDE 500 MG: 500 TABLET, FILM COATED ORAL at 08:06

## 2022-06-24 RX ADMIN — THERA TABS 1 TABLET: TAB at 08:06

## 2022-06-24 RX ADMIN — LEVETIRACETAM 1000 MG: 100 SOLUTION ORAL at 08:06

## 2022-06-24 RX ADMIN — PHENYTOIN 200 MG: 125 SUSPENSION ORAL at 08:06

## 2022-06-24 RX ADMIN — METOCLOPRAMIDE 5 MG: 5 TABLET ORAL at 08:06

## 2022-06-24 RX ADMIN — PIPERACILLIN SODIUM AND TAZOBACTAM SODIUM 4.5 G: 4; 500 INJECTION, POWDER, FOR SOLUTION INTRAVENOUS at 08:06

## 2022-06-24 RX ADMIN — Medication 1 APPLICATION: at 04:06

## 2022-06-24 RX ADMIN — PANTOPRAZOLE SODIUM 40 MG: 40 INJECTION, POWDER, LYOPHILIZED, FOR SOLUTION INTRAVENOUS at 10:06

## 2022-06-24 RX ADMIN — POTASSIUM BICARBONATE 40 MEQ: 391 TABLET, EFFERVESCENT ORAL at 08:06

## 2022-06-24 RX ADMIN — VANCOMYCIN HYDROCHLORIDE 1500 MG: 5 INJECTION, POWDER, LYOPHILIZED, FOR SOLUTION INTRAVENOUS at 03:06

## 2022-06-24 RX ADMIN — POLYETHYLENE GLYCOL 3350 17 G: 17 POWDER, FOR SOLUTION ORAL at 08:06

## 2022-06-24 RX ADMIN — METFORMIN HYDROCHLORIDE 500 MG: 500 TABLET, FILM COATED ORAL at 06:06

## 2022-06-24 RX ADMIN — INSULIN ASPART 4 UNITS: 100 INJECTION, SOLUTION INTRAVENOUS; SUBCUTANEOUS at 06:06

## 2022-06-24 RX ADMIN — MUPIROCIN: 20 OINTMENT TOPICAL at 09:06

## 2022-06-24 RX ADMIN — ASPIRIN 81 MG 81 MG: 81 TABLET ORAL at 08:06

## 2022-06-24 RX ADMIN — PIPERACILLIN SODIUM AND TAZOBACTAM SODIUM 4.5 G: 4; 500 INJECTION, POWDER, FOR SOLUTION INTRAVENOUS at 12:06

## 2022-06-24 RX ADMIN — AMLODIPINE BESYLATE 5 MG: 5 TABLET ORAL at 08:06

## 2022-06-24 RX ADMIN — ATORVASTATIN CALCIUM 80 MG: 80 TABLET, FILM COATED ORAL at 08:06

## 2022-06-24 RX ADMIN — DONEPEZIL HYDROCHLORIDE 10 MG: 5 TABLET ORAL at 08:06

## 2022-06-24 RX ADMIN — SERTRALINE HYDROCHLORIDE 100 MG: 50 TABLET ORAL at 08:06

## 2022-06-24 NOTE — PROGRESS NOTES
[facility] Medicine  Progress Note    Patient Name: Mahesh Acuña  MRN: [id number]  Patient Class: IP- Inpatient   Admission Date: 6/21/2022  Length of Stay: 3 days  Attending Physician: Umang De Leon Jr., MD  Primary Care Provider: Primary Doctor No        Subjective:     Principal Problem:Aspiration pneumonia        HPI:  Patient is a 68 yo male from [facility] that has a pmhx of CVA (nonverbal), left BKA, PVD, dysphagia with PEG tube that presented to [facility] with shortness of breath. He was admitted and treated for sepsis 2/2 aspiration pneumonia, elevated troponins, and bilateral LE DVTs. He is on IV Vancomycin and Zosyn that will need to be continued for a total of 10 days. He was on a heparin drip up until today, but has now been transitioned to Eliquis in order to treat DVTs. He will be discharged from [facility] and admitted to [facility] for continued antibiotics.      Overview/Hospital Course:  06/22/2022 Resting in bed. NRB  on at 15 liters.Chest is clear. Heart RRR. NA is 149. Potassium is 3.3. Continue abx for aspiration pneumonia. Blood culture shows gram positive cocci.       Interval History: Patient had what appears to be a near aspiration event this am. Found with foaming from mouth and nose, was suctioned. Was alert and not postictal appearing. HOB was at 30 degrees. Suspect he refluxed    Review of Systems   Unable to perform ROS: Patient nonverbal   Objective:     Vital Signs (Most Recent):  Temp: 98.1 °F (36.7 °C) (06/24/22 1200)  Pulse: (!) 112 (06/24/22 1200)  Resp: 20 (06/24/22 1200)  BP: 131/79 (06/24/22 1200)  SpO2: 99 % (06/24/22 1200)   Vital Signs (24h Range):  Temp:  [97.5 °F (36.4 °C)-98.1 °F (36.7 °C)] 98.1 °F (36.7 °C)  Pulse:  [] 112  Resp:  [15-20] 20  SpO2:  [96 %-100 %] 99 %  BP: (107-138)/(60-84) 131/79     Weight: 71.9 kg (158 lb 9.6 oz)  Body mass index is 21.51  kg/m².    Intake/Output Summary (Last 24 hours) at 6/24/2022 1339  Last data filed at 6/24/2022 0806  Gross per 24 hour   Intake 1200 ml   Output --   Net 1200 ml      Physical Exam  Vitals reviewed.   Constitutional:       Appearance: Normal appearance.   Eyes:      General: No scleral icterus.     Pupils: Pupils are equal, round, and reactive to light.   Cardiovascular:      Rate and Rhythm: Normal rate and regular rhythm.      Heart sounds: Normal heart sounds.   Pulmonary:      Effort: Pulmonary effort is normal. No respiratory distress.      Breath sounds: Rhonchi present. No wheezing.   Abdominal:      General: Abdomen is flat. There is no distension.      Palpations: Abdomen is soft.      Tenderness: There is no abdominal tenderness.   Skin:     General: Skin is warm and dry.   Neurological:      Mental Status: He is alert.       Significant Labs: All pertinent labs within the past 24 hours have been reviewed.  BMP: No results for input(s): GLU, NA, K, CL, CO2, BUN, CREATININE, CALCIUM, MG in the last 48 hours.  CBC: No results for input(s): WBC, HGB, HCT, PLT in the last 48 hours.    Significant Imaging: I have reviewed all pertinent imaging results/findings within the past 24 hours.Nothing acute.       Assessment/Plan:      * Aspiration pneumonia  - continue IV Vancomycin and Zosyn  - Pharmacy to dose Vanc      06/22/2022  -continue vancomycin and zosyn     6/23  -improving. Continue abx.     6/24 - near aspiration again. Will add some reglan to promote stomach emptying.       Deep vein thrombosis (DVT) of both lower extremities  - Heparin changed to Eliquis today      06/22/2022  -continue eliquiis BID       Elevated troponin  - cardiology consulted, recommend continuing medical management and patient is a poor candidate for invasive procedures      Hx of stroke associated with blood clotting tendency  - continue Eliquis      DM2 (diabetes mellitus, type 2)  - continue prior diabetes  management    -06/22/2022  -accuchecks every 6 hours    6/23 - glucoses a little low. Will d/c lantus and see how he does with metformin alone.     Sepsis  - no signs of shock currently  - BCs (pending)  - IV antibiotics    Positive BCx likely a contaminant.         VTE Risk Mitigation (From admission, onward)         Ordered     apixaban tablet 5 mg  2 times daily         06/22/22 0813     IP VTE LOW RISK PATIENT  Once         06/21/22 1855     Place sequential compression device  Until discontinued         06/21/22 1855                Discharge Planning   BYRON:      Code Status: Full Code   Is the patient medically ready for discharge?:     Reason for patient still in hospital (select all that apply): Treatment  Discharge Plan A: Return to nursing home                  Umang De Leon Jr, MD  Department of Hospital Medicine   [facility]

## 2022-06-24 NOTE — SUBJECTIVE & OBJECTIVE
Interval History: Patient had what appears to be a near aspiration event this am. Found with foaming from mouth and nose, was suctioned. Was alert and not postictal appearing. HOB was at 30 degrees. Suspect he refluxed    Review of Systems   Unable to perform ROS: Patient nonverbal   Objective:     Vital Signs (Most Recent):  Temp: 98.1 °F (36.7 °C) (06/24/22 1200)  Pulse: (!) 112 (06/24/22 1200)  Resp: 20 (06/24/22 1200)  BP: 131/79 (06/24/22 1200)  SpO2: 99 % (06/24/22 1200)   Vital Signs (24h Range):  Temp:  [97.5 °F (36.4 °C)-98.1 °F (36.7 °C)] 98.1 °F (36.7 °C)  Pulse:  [] 112  Resp:  [15-20] 20  SpO2:  [96 %-100 %] 99 %  BP: (107-138)/(60-84) 131/79     Weight: 71.9 kg (158 lb 9.6 oz)  Body mass index is 21.51 kg/m².    Intake/Output Summary (Last 24 hours) at 6/24/2022 1339  Last data filed at 6/24/2022 0806  Gross per 24 hour   Intake 1200 ml   Output --   Net 1200 ml      Physical Exam  Vitals reviewed.   Constitutional:       Appearance: Normal appearance.   Eyes:      General: No scleral icterus.     Pupils: Pupils are equal, round, and reactive to light.   Cardiovascular:      Rate and Rhythm: Normal rate and regular rhythm.      Heart sounds: Normal heart sounds.   Pulmonary:      Effort: Pulmonary effort is normal. No respiratory distress.      Breath sounds: Rhonchi present. No wheezing.   Abdominal:      General: Abdomen is flat. There is no distension.      Palpations: Abdomen is soft.      Tenderness: There is no abdominal tenderness.   Skin:     General: Skin is warm and dry.   Neurological:      Mental Status: He is alert.       Significant Labs: All pertinent labs within the past 24 hours have been reviewed.  BMP: No results for input(s): GLU, NA, K, CL, CO2, BUN, CREATININE, CALCIUM, MG in the last 48 hours.  CBC: No results for input(s): WBC, HGB, HCT, PLT in the last 48 hours.    Significant Imaging: I have reviewed all pertinent imaging results/findings within the past 24 hours.Nothing  acute.

## 2022-06-24 NOTE — ASSESSMENT & PLAN NOTE
- continue IV Vancomycin and Zosyn  - Pharmacy to dose Vanc      06/22/2022  -continue vancomycin and zosyn     6/23  -improving. Continue abx.     6/24 - near aspiration again. Will add some reglan to promote stomach emptying.

## 2022-06-24 NOTE — PLAN OF CARE
Problem: Adult Inpatient Plan of Care  Goal: Plan of Care Review  Outcome: Ongoing, Progressing  Goal: Patient-Specific Goal (Individualized)  Outcome: Ongoing, Progressing  Goal: Absence of Hospital-Acquired Illness or Injury  Outcome: Ongoing, Progressing  Goal: Optimal Comfort and Wellbeing  Outcome: Ongoing, Progressing  Goal: Readiness for Transition of Care  Outcome: Ongoing, Progressing     Problem: Diabetes Comorbidity  Goal: Blood Glucose Level Within Targeted Range  Outcome: Ongoing, Progressing     Problem: Adjustment to Illness (Sepsis/Septic Shock)  Goal: Optimal Coping  Outcome: Ongoing, Progressing     Problem: Bleeding (Sepsis/Septic Shock)  Goal: Absence of Bleeding  Outcome: Ongoing, Progressing     Problem: Glycemic Control Impaired (Sepsis/Septic Shock)  Goal: Blood Glucose Level Within Desired Range  Outcome: Ongoing, Progressing     Problem: Infection Progression (Sepsis/Septic Shock)  Goal: Absence of Infection Signs and Symptoms  Outcome: Ongoing, Progressing     Problem: Nutrition Impaired (Sepsis/Septic Shock)  Goal: Optimal Nutrition Intake  Outcome: Ongoing, Progressing     Problem: Impaired Wound Healing  Goal: Optimal Wound Healing  Outcome: Ongoing, Progressing     Problem: Fall Injury Risk  Goal: Absence of Fall and Fall-Related Injury  Outcome: Ongoing, Progressing     Problem: Skin Injury Risk Increased  Goal: Skin Health and Integrity  Outcome: Ongoing, Progressing     Problem: Gas Exchange Impaired  Goal: Optimal Gas Exchange  Outcome: Ongoing, Progressing

## 2022-06-25 LAB
BACTERIA BLD CULT: NORMAL
CULTURE, LOWER RESPIRATORY: ABNORMAL
GLUCOSE SERPL-MCNC: 112 MG/DL (ref 70–105)
GLUCOSE SERPL-MCNC: 116 MG/DL (ref 70–105)
GLUCOSE SERPL-MCNC: 119 MG/DL (ref 70–105)
GLUCOSE SERPL-MCNC: 135 MG/DL (ref 70–105)
GLUCOSE SERPL-MCNC: 180 MG/DL (ref 70–105)
GLUCOSE SERPL-MCNC: 195 MG/DL (ref 70–105)
GRAM STN SPEC: ABNORMAL

## 2022-06-25 PROCEDURE — 99232 SBSQ HOSP IP/OBS MODERATE 35: CPT | Mod: ,,, | Performed by: FAMILY MEDICINE

## 2022-06-25 PROCEDURE — 27000221 HC OXYGEN, UP TO 24 HOURS

## 2022-06-25 PROCEDURE — 94640 AIRWAY INHALATION TREATMENT: CPT

## 2022-06-25 PROCEDURE — 25000242 PHARM REV CODE 250 ALT 637 W/ HCPCS: Performed by: FAMILY MEDICINE

## 2022-06-25 PROCEDURE — 82962 GLUCOSE BLOOD TEST: CPT

## 2022-06-25 PROCEDURE — 94761 N-INVAS EAR/PLS OXIMETRY MLT: CPT

## 2022-06-25 PROCEDURE — 63600175 PHARM REV CODE 636 W HCPCS: Performed by: FAMILY MEDICINE

## 2022-06-25 PROCEDURE — 11000001 HC ACUTE MED/SURG PRIVATE ROOM

## 2022-06-25 PROCEDURE — 25000003 PHARM REV CODE 250: Performed by: FAMILY MEDICINE

## 2022-06-25 PROCEDURE — 27200966 HC CLOSED SUCTION SYSTEM

## 2022-06-25 PROCEDURE — C9113 INJ PANTOPRAZOLE SODIUM, VIA: HCPCS | Performed by: FAMILY MEDICINE

## 2022-06-25 PROCEDURE — 99232 PR SUBSEQUENT HOSPITAL CARE,LEVL II: ICD-10-PCS | Mod: ,,, | Performed by: FAMILY MEDICINE

## 2022-06-25 RX ORDER — AMOXICILLIN AND CLAVULANATE POTASSIUM 875; 125 MG/1; MG/1
1 TABLET, FILM COATED ORAL EVERY 12 HOURS
Status: DISCONTINUED | OUTPATIENT
Start: 2022-06-25 | End: 2022-06-27 | Stop reason: HOSPADM

## 2022-06-25 RX ADMIN — PIPERACILLIN SODIUM AND TAZOBACTAM SODIUM 4.5 G: 4; 500 INJECTION, POWDER, FOR SOLUTION INTRAVENOUS at 03:06

## 2022-06-25 RX ADMIN — METFORMIN HYDROCHLORIDE 500 MG: 500 TABLET, FILM COATED ORAL at 08:06

## 2022-06-25 RX ADMIN — METOPROLOL TARTRATE 12.5 MG: 25 TABLET, FILM COATED ORAL at 08:06

## 2022-06-25 RX ADMIN — METOCLOPRAMIDE 5 MG: 5 TABLET ORAL at 09:06

## 2022-06-25 RX ADMIN — PHENYTOIN 200 MG: 125 SUSPENSION ORAL at 08:06

## 2022-06-25 RX ADMIN — LEVETIRACETAM 1000 MG: 100 SOLUTION ORAL at 08:06

## 2022-06-25 RX ADMIN — IPRATROPIUM BROMIDE AND ALBUTEROL SULFATE 3 ML: 2.5; .5 SOLUTION RESPIRATORY (INHALATION) at 07:06

## 2022-06-25 RX ADMIN — METFORMIN HYDROCHLORIDE 500 MG: 500 TABLET, FILM COATED ORAL at 05:06

## 2022-06-25 RX ADMIN — MUPIROCIN: 20 OINTMENT TOPICAL at 09:06

## 2022-06-25 RX ADMIN — THERA TABS 1 TABLET: TAB at 08:06

## 2022-06-25 RX ADMIN — PANTOPRAZOLE SODIUM 40 MG: 40 INJECTION, POWDER, LYOPHILIZED, FOR SOLUTION INTRAVENOUS at 10:06

## 2022-06-25 RX ADMIN — IPRATROPIUM BROMIDE AND ALBUTEROL SULFATE 3 ML: 2.5; .5 SOLUTION RESPIRATORY (INHALATION) at 01:06

## 2022-06-25 RX ADMIN — POLYETHYLENE GLYCOL 3350 17 G: 17 POWDER, FOR SOLUTION ORAL at 08:06

## 2022-06-25 RX ADMIN — PHENYTOIN 200 MG: 125 SUSPENSION ORAL at 09:06

## 2022-06-25 RX ADMIN — IPRATROPIUM BROMIDE AND ALBUTEROL SULFATE 3 ML: 2.5; .5 SOLUTION RESPIRATORY (INHALATION) at 12:06

## 2022-06-25 RX ADMIN — APIXABAN 5 MG: 5 TABLET, FILM COATED ORAL at 09:06

## 2022-06-25 RX ADMIN — INSULIN ASPART 2 UNITS: 100 INJECTION, SOLUTION INTRAVENOUS; SUBCUTANEOUS at 12:06

## 2022-06-25 RX ADMIN — POTASSIUM BICARBONATE 40 MEQ: 391 TABLET, EFFERVESCENT ORAL at 08:06

## 2022-06-25 RX ADMIN — METOCLOPRAMIDE 5 MG: 5 TABLET ORAL at 08:06

## 2022-06-25 RX ADMIN — SERTRALINE HYDROCHLORIDE 100 MG: 50 TABLET ORAL at 08:06

## 2022-06-25 RX ADMIN — AMOXICILLIN AND CLAVULANATE POTASSIUM 1 TABLET: 875; 125 TABLET, FILM COATED ORAL at 09:06

## 2022-06-25 RX ADMIN — LEVETIRACETAM 1000 MG: 100 SOLUTION ORAL at 09:06

## 2022-06-25 RX ADMIN — ASPIRIN 81 MG 81 MG: 81 TABLET ORAL at 08:06

## 2022-06-25 RX ADMIN — POTASSIUM BICARBONATE 40 MEQ: 391 TABLET, EFFERVESCENT ORAL at 09:06

## 2022-06-25 RX ADMIN — APIXABAN 5 MG: 5 TABLET, FILM COATED ORAL at 08:06

## 2022-06-25 RX ADMIN — INSULIN ASPART 1 UNITS: 100 INJECTION, SOLUTION INTRAVENOUS; SUBCUTANEOUS at 09:06

## 2022-06-25 RX ADMIN — METOPROLOL TARTRATE 12.5 MG: 25 TABLET, FILM COATED ORAL at 09:06

## 2022-06-25 RX ADMIN — DONEPEZIL HYDROCHLORIDE 10 MG: 5 TABLET ORAL at 09:06

## 2022-06-25 RX ADMIN — ATORVASTATIN CALCIUM 80 MG: 80 TABLET, FILM COATED ORAL at 08:06

## 2022-06-25 RX ADMIN — AMLODIPINE BESYLATE 5 MG: 5 TABLET ORAL at 08:06

## 2022-06-25 RX ADMIN — MUPIROCIN: 20 OINTMENT TOPICAL at 08:06

## 2022-06-25 NOTE — SUBJECTIVE & OBJECTIVE
Interval History: Non verbal but alert. Difficulty maintaining IV access.     Review of Systems   Unable to perform ROS: Patient nonverbal   Objective:     Vital Signs (Most Recent):  Temp: 97.8 °F (36.6 °C) (06/25/22 1200)  Pulse: 85 (06/25/22 1214)  Resp: 20 (06/25/22 1214)  BP: (!) 142/87 (06/25/22 1200)  SpO2: 99 % (06/25/22 1214)   Vital Signs (24h Range):  Temp:  [97.5 °F (36.4 °C)-98.4 °F (36.9 °C)] 97.8 °F (36.6 °C)  Pulse:  [] 85  Resp:  [16-20] 20  SpO2:  [98 %-100 %] 99 %  BP: (134-164)/(75-97) 142/87     Weight: 71.9 kg (158 lb 9.6 oz)  Body mass index is 21.51 kg/m².    Intake/Output Summary (Last 24 hours) at 6/25/2022 1643  Last data filed at 6/24/2022 1800  Gross per 24 hour   Intake --   Output 8 ml   Net -8 ml      Physical Exam  Vitals reviewed.   Constitutional:       General: He is not in acute distress.     Appearance: He is not ill-appearing.   HENT:      Head: Normocephalic and atraumatic.   Eyes:      General: No scleral icterus.  Cardiovascular:      Rate and Rhythm: Normal rate and regular rhythm.      Heart sounds: Normal heart sounds.   Pulmonary:      Effort: Pulmonary effort is normal. No respiratory distress.      Breath sounds: Normal breath sounds.   Abdominal:      General: Abdomen is flat. Bowel sounds are normal. There is no distension.      Palpations: Abdomen is soft.      Tenderness: There is no abdominal tenderness.   Skin:     General: Skin is warm and dry.   Neurological:      Mental Status: He is alert. Mental status is at baseline.       Significant Labs: All pertinent labs within the past 24 hours have been reviewed.    Significant Imaging: I have reviewed all pertinent imaging results/findings within the past 24 hours.

## 2022-06-25 NOTE — ASSESSMENT & PLAN NOTE
- continue IV Vancomycin and Zosyn  - Pharmacy to dose Vanc      06/22/2022  -continue vancomycin and zosyn     6/23  -improving. Continue abx.     6/24 - near aspiration again. Will add some reglan to promote stomach emptying.     6/26 - Completed 6 days iv with clinical improvement. Will switch to po. Discharge planning.

## 2022-06-25 NOTE — DISCHARGE SUMMARY
[facility] Medicine  Progress Note    Patient Name: Mahesh Acuña  MRN: [id number]  Patient Class: IP- Inpatient   Admission Date: 6/21/2022  Length of Stay: 4 days  Attending Physician: Umang De Leon Jr., MD  Primary Care Provider: Primary Doctor No        Subjective:     Principal Problem:Aspiration pneumonia        HPI:  Patient is a 66 yo male from [facility] that has a pmhx of CVA (nonverbal), left BKA, PVD, dysphagia with PEG tube that presented to [facility] with shortness of breath. He was admitted and treated for sepsis 2/2 aspiration pneumonia, elevated troponins, and bilateral LE DVTs. He is on IV Vancomycin and Zosyn that will need to be continued for a total of 10 days. He was on a heparin drip up until today, but has now been transitioned to Eliquis in order to treat DVTs. He will be discharged from [facility] and admitted to [facility] for continued antibiotics.      Overview/Hospital Course:  06/22/2022 Resting in bed. NRB  on at 15 liters.Chest is clear. Heart RRR. NA is 149. Potassium is 3.3. Continue abx for aspiration pneumonia. Blood culture shows gram positive cocci.       Interval History: Non verbal but alert. Difficulty maintaining IV access.     Review of Systems   Unable to perform ROS: Patient nonverbal   Objective:     Vital Signs (Most Recent):  Temp: 97.8 °F (36.6 °C) (06/25/22 1200)  Pulse: 85 (06/25/22 1214)  Resp: 20 (06/25/22 1214)  BP: (!) 142/87 (06/25/22 1200)  SpO2: 99 % (06/25/22 1214)   Vital Signs (24h Range):  Temp:  [97.5 °F (36.4 °C)-98.4 °F (36.9 °C)] 97.8 °F (36.6 °C)  Pulse:  [] 85  Resp:  [16-20] 20  SpO2:  [98 %-100 %] 99 %  BP: (134-164)/(75-97) 142/87     Weight: 71.9 kg (158 lb 9.6 oz)  Body mass index is 21.51 kg/m².    Intake/Output Summary (Last 24 hours) at 6/25/2022 1643  Last data filed at 6/24/2022 1800  Gross per 24 hour   Intake --   Output 8 ml   Net -8 ml       Physical Exam  Vitals reviewed.   Constitutional:       General: He is not in acute distress.     Appearance: He is not ill-appearing.   HENT:      Head: Normocephalic and atraumatic.   Eyes:      General: No scleral icterus.  Cardiovascular:      Rate and Rhythm: Normal rate and regular rhythm.      Heart sounds: Normal heart sounds.   Pulmonary:      Effort: Pulmonary effort is normal. No respiratory distress.      Breath sounds: Normal breath sounds.   Abdominal:      General: Abdomen is flat. Bowel sounds are normal. There is no distension.      Palpations: Abdomen is soft.      Tenderness: There is no abdominal tenderness.   Skin:     General: Skin is warm and dry.   Neurological:      Mental Status: He is alert. Mental status is at baseline.       Significant Labs: All pertinent labs within the past 24 hours have been reviewed.    Significant Imaging: I have reviewed all pertinent imaging results/findings within the past 24 hours.      Assessment/Plan:      * Aspiration pneumonia  - continue IV Vancomycin and Zosyn  - Pharmacy to dose Vanc      06/22/2022  -continue vancomycin and zosyn     6/23  -improving. Continue abx.     6/24 - near aspiration again. Will add some reglan to promote stomach emptying.     6/26 - Completed 6 days iv with clinical improvement. Will switch to po. Discharge planning.       Deep vein thrombosis (DVT) of both lower extremities  - Heparin changed to Eliquis today      06/22/2022  -continue eliquiis BID       Elevated troponin  - cardiology consulted, recommend continuing medical management and patient is a poor candidate for invasive procedures      Hx of stroke associated with blood clotting tendency  - continue Eliquis      DM2 (diabetes mellitus, type 2)  - continue prior diabetes management    -06/22/2022  -accuchecks every 6 hours    6/23 - glucoses a little low. Will d/c lantus and see how he does with metformin alone.     6/25 - glucoses ok.     Sepsis  -resolved      VTE  Risk Mitigation (From admission, onward)         Ordered     apixaban tablet 5 mg  2 times daily         06/22/22 0813     IP VTE LOW RISK PATIENT  Once         06/21/22 1855     Place sequential compression device  Until discontinued         06/21/22 1855                Discharge Planning   BYRON:      Code Status: Full Code   Is the patient medically ready for discharge?:     Reason for patient still in hospital (select all that apply): Treatment  Discharge Plan A: Return to nursing home                  Umang De Leon Jr, MD  Department of Hospital Medicine   [facility]

## 2022-06-25 NOTE — NURSING
Dr. De Leon notified that Mr. Acuña 12pm dose of Zosyn was missed due to loss of IV access.  Multiple staff members have attempted IV access with no success. Nursing supervisor has another nurse from a different unit coming to start IV access. No new orders from Dr. De Leon at this time.

## 2022-06-25 NOTE — ASSESSMENT & PLAN NOTE
- continue prior diabetes management    -06/22/2022  -accuchecks every 6 hours    6/23 - glucoses a little low. Will d/c lantus and see how he does with metformin alone.     6/25 - glucoses ok.

## 2022-06-26 LAB
GLUCOSE SERPL-MCNC: 131 MG/DL (ref 70–105)
GLUCOSE SERPL-MCNC: 152 MG/DL (ref 70–105)
GLUCOSE SERPL-MCNC: 160 MG/DL (ref 70–105)
VANCOMYCIN TROUGH SERPL-MCNC: 9.8 ΜG/ML (ref 10–20)

## 2022-06-26 PROCEDURE — 25000003 PHARM REV CODE 250: Performed by: FAMILY MEDICINE

## 2022-06-26 PROCEDURE — 96372 THER/PROPH/DIAG INJ SC/IM: CPT

## 2022-06-26 PROCEDURE — 63600175 PHARM REV CODE 636 W HCPCS: Performed by: FAMILY MEDICINE

## 2022-06-26 PROCEDURE — 27000221 HC OXYGEN, UP TO 24 HOURS

## 2022-06-26 PROCEDURE — 94761 N-INVAS EAR/PLS OXIMETRY MLT: CPT

## 2022-06-26 PROCEDURE — 99232 PR SUBSEQUENT HOSPITAL CARE,LEVL II: ICD-10-PCS | Mod: ,,, | Performed by: FAMILY MEDICINE

## 2022-06-26 PROCEDURE — 94640 AIRWAY INHALATION TREATMENT: CPT

## 2022-06-26 PROCEDURE — 11000001 HC ACUTE MED/SURG PRIVATE ROOM

## 2022-06-26 PROCEDURE — 99900035 HC TECH TIME PER 15 MIN (STAT)

## 2022-06-26 PROCEDURE — 99232 SBSQ HOSP IP/OBS MODERATE 35: CPT | Mod: ,,, | Performed by: FAMILY MEDICINE

## 2022-06-26 PROCEDURE — 25000242 PHARM REV CODE 250 ALT 637 W/ HCPCS: Performed by: FAMILY MEDICINE

## 2022-06-26 PROCEDURE — 80202 ASSAY OF VANCOMYCIN: CPT | Performed by: FAMILY MEDICINE

## 2022-06-26 PROCEDURE — 82962 GLUCOSE BLOOD TEST: CPT

## 2022-06-26 RX ADMIN — METOCLOPRAMIDE 5 MG: 5 TABLET ORAL at 08:06

## 2022-06-26 RX ADMIN — DONEPEZIL HYDROCHLORIDE 10 MG: 5 TABLET ORAL at 08:06

## 2022-06-26 RX ADMIN — POLYETHYLENE GLYCOL 3350 17 G: 17 POWDER, FOR SOLUTION ORAL at 08:06

## 2022-06-26 RX ADMIN — METOPROLOL TARTRATE 12.5 MG: 25 TABLET, FILM COATED ORAL at 08:06

## 2022-06-26 RX ADMIN — ASPIRIN 81 MG 81 MG: 81 TABLET ORAL at 08:06

## 2022-06-26 RX ADMIN — APIXABAN 5 MG: 5 TABLET, FILM COATED ORAL at 08:06

## 2022-06-26 RX ADMIN — IPRATROPIUM BROMIDE AND ALBUTEROL SULFATE 3 ML: 2.5; .5 SOLUTION RESPIRATORY (INHALATION) at 12:06

## 2022-06-26 RX ADMIN — METFORMIN HYDROCHLORIDE 500 MG: 500 TABLET, FILM COATED ORAL at 05:06

## 2022-06-26 RX ADMIN — PHENYTOIN 200 MG: 125 SUSPENSION ORAL at 08:06

## 2022-06-26 RX ADMIN — SERTRALINE HYDROCHLORIDE 100 MG: 50 TABLET ORAL at 08:06

## 2022-06-26 RX ADMIN — AMOXICILLIN AND CLAVULANATE POTASSIUM 1 TABLET: 875; 125 TABLET, FILM COATED ORAL at 08:06

## 2022-06-26 RX ADMIN — THERA TABS 1 TABLET: TAB at 08:06

## 2022-06-26 RX ADMIN — AMLODIPINE BESYLATE 5 MG: 5 TABLET ORAL at 08:06

## 2022-06-26 RX ADMIN — LEVETIRACETAM 1000 MG: 100 SOLUTION ORAL at 08:06

## 2022-06-26 RX ADMIN — INSULIN ASPART 2 UNITS: 100 INJECTION, SOLUTION INTRAVENOUS; SUBCUTANEOUS at 05:06

## 2022-06-26 RX ADMIN — METFORMIN HYDROCHLORIDE 500 MG: 500 TABLET, FILM COATED ORAL at 08:06

## 2022-06-26 RX ADMIN — MUPIROCIN: 20 OINTMENT TOPICAL at 08:06

## 2022-06-26 RX ADMIN — POTASSIUM BICARBONATE 40 MEQ: 391 TABLET, EFFERVESCENT ORAL at 08:06

## 2022-06-26 RX ADMIN — ATORVASTATIN CALCIUM 80 MG: 80 TABLET, FILM COATED ORAL at 08:06

## 2022-06-26 RX ADMIN — IPRATROPIUM BROMIDE AND ALBUTEROL SULFATE 3 ML: 2.5; .5 SOLUTION RESPIRATORY (INHALATION) at 07:06

## 2022-06-26 RX ADMIN — IPRATROPIUM BROMIDE AND ALBUTEROL SULFATE 3 ML: 2.5; .5 SOLUTION RESPIRATORY (INHALATION) at 08:06

## 2022-06-26 NOTE — NURSING
Notified Dr. De Leon of multiple attempts by staff to obtain a patent IV access site with no results.     1659 new order for po augmentin noted per Dr. De Leon.

## 2022-06-26 NOTE — PLAN OF CARE
Problem: Fall Injury Risk  Goal: Absence of Fall and Fall-Related Injury  Outcome: Ongoing, Progressing     Problem: Skin Injury Risk Increased  Goal: Skin Health and Integrity  Outcome: Ongoing, Progressing     Problem: Fall Injury Risk  Goal: Absence of Fall and Fall-Related Injury  Outcome: Ongoing, Progressing     Problem: Skin Injury Risk Increased  Goal: Skin Health and Integrity  Outcome: Ongoing, Progressing

## 2022-06-26 NOTE — PROGRESS NOTES
[facility] Medicine  Progress Note    Patient Name: Mahesh Acuña  MRN: [id number]  Patient Class: IP- Inpatient   Admission Date: 6/21/2022  Length of Stay: 5 days  Attending Physician: Umang De Leon Jr., MD  Primary Care Provider: Primary Doctor No        Subjective:     Principal Problem:Aspiration pneumonia        HPI:  Patient is a 68 yo male from [facility] that has a pmhx of CVA (nonverbal), left BKA, PVD, dysphagia with PEG tube that presented to [facility] with shortness of breath. He was admitted and treated for sepsis 2/2 aspiration pneumonia, elevated troponins, and bilateral LE DVTs. He is on IV Vancomycin and Zosyn that will need to be continued for a total of 10 days. He was on a heparin drip up until today, but has now been transitioned to Eliquis in order to treat DVTs. He will be discharged from [facility] and admitted to [facility] for continued antibiotics.      Overview/Hospital Course:  06/22/2022 Resting in bed. NRB  on at 15 liters.Chest is clear. Heart RRR. NA is 149. Potassium is 3.3. Continue abx for aspiration pneumonia. Blood culture shows gram positive cocci.       Interval History: Lost IV access after 5 days IV abx. Have switched to po. Can go to NH tomorrow if no acute events overnight.     Review of Systems   Unable to perform ROS: Patient nonverbal   Objective:     Vital Signs (Most Recent):  Temp: 97.4 °F (36.3 °C) (06/26/22 1200)  Pulse: 88 (06/26/22 1241)  Resp: 18 (06/26/22 1241)  BP: 128/84 (06/26/22 1200)  SpO2: 100 % (06/26/22 1241) Vital Signs (24h Range):  Temp:  [97.4 °F (36.3 °C)-98.3 °F (36.8 °C)] 97.4 °F (36.3 °C)  Pulse:  [72-99] 88  Resp:  [16-19] 18  SpO2:  [97 %-100 %] 100 %  BP: (128-145)/(69-90) 128/84     Weight: 71.9 kg (158 lb 9.6 oz)  Body mass index is 21.51 kg/m².  No intake or output data in the 24 hours ending 06/26/22 1446   Physical Exam  Vitals reviewed.    Constitutional:       General: He is not in acute distress.     Appearance: He is not ill-appearing.   HENT:      Head: Normocephalic and atraumatic.   Eyes:      General: No scleral icterus.  Cardiovascular:      Rate and Rhythm: Normal rate and regular rhythm.      Heart sounds: Normal heart sounds.   Pulmonary:      Effort: Pulmonary effort is normal. No respiratory distress.      Breath sounds: Normal breath sounds.   Abdominal:      General: Abdomen is flat. Bowel sounds are normal. There is no distension.      Palpations: Abdomen is soft.      Tenderness: There is no abdominal tenderness.   Skin:     General: Skin is warm and dry.   Neurological:      Mental Status: He is alert. Mental status is at baseline.       Significant Labs: All pertinent labs within the past 24 hours have been reviewed.  BMP: No results for input(s): GLU, NA, K, CL, CO2, BUN, CREATININE, CALCIUM, MG in the last 48 hours.  CBC: No results for input(s): WBC, HGB, HCT, PLT in the last 48 hours.    Significant Imaging: I have reviewed all pertinent imaging results/findings within the past 24 hours.      Assessment/Plan:      * Aspiration pneumonia  - continue IV Vancomycin and Zosyn  - Pharmacy to dose Vanc      06/22/2022  -continue vancomycin and zosyn     6/23  -improving. Continue abx.     6/24 - near aspiration again. Will add some reglan to promote stomach emptying.     6/26 - Completed 6 days iv with clinical improvement. Have switched to po augmentin. D/c planning.       Deep vein thrombosis (DVT) of both lower extremities  - Heparin changed to Eliquis today      06/22/2022  -continue eliquiis BID       Elevated troponin  - cardiology consulted, recommend continuing medical management and patient is a poor candidate for invasive procedures      Hx of stroke associated with blood clotting tendency  - continue Eliquis      DM2 (diabetes mellitus, type 2)  - continue prior diabetes management    -06/22/2022  -accuchecks every 6  hours    6/23 - glucoses a little low. Will d/c lantus and see how he does with metformin alone.     6/25 - glucoses ok.     Sepsis  -resolved      VTE Risk Mitigation (From admission, onward)         Ordered     apixaban tablet 5 mg  2 times daily         06/22/22 0813     IP VTE LOW RISK PATIENT  Once         06/21/22 1855     Place sequential compression device  Until discontinued         06/21/22 1855                Discharge Planning   BYRON:      Code Status: Full Code   Is the patient medically ready for discharge?:     Reason for patient still in hospital (select all that apply): Treatment  Discharge Plan A: Return to nursing home                  Umang De Leon Jr, MD  Department of Hospital Medicine   [facility]

## 2022-06-26 NOTE — SUBJECTIVE & OBJECTIVE
Interval History: Lost IV access after 5 days IV abx. Have switched to po. Can go to NH tomorrow if no acute events overnight.     Review of Systems   Unable to perform ROS: Patient nonverbal   Objective:     Vital Signs (Most Recent):  Temp: 97.4 °F (36.3 °C) (06/26/22 1200)  Pulse: 88 (06/26/22 1241)  Resp: 18 (06/26/22 1241)  BP: 128/84 (06/26/22 1200)  SpO2: 100 % (06/26/22 1241) Vital Signs (24h Range):  Temp:  [97.4 °F (36.3 °C)-98.3 °F (36.8 °C)] 97.4 °F (36.3 °C)  Pulse:  [72-99] 88  Resp:  [16-19] 18  SpO2:  [97 %-100 %] 100 %  BP: (128-145)/(69-90) 128/84     Weight: 71.9 kg (158 lb 9.6 oz)  Body mass index is 21.51 kg/m².  No intake or output data in the 24 hours ending 06/26/22 1446   Physical Exam  Vitals reviewed.   Constitutional:       General: He is not in acute distress.     Appearance: He is not ill-appearing.   HENT:      Head: Normocephalic and atraumatic.   Eyes:      General: No scleral icterus.  Cardiovascular:      Rate and Rhythm: Normal rate and regular rhythm.      Heart sounds: Normal heart sounds.   Pulmonary:      Effort: Pulmonary effort is normal. No respiratory distress.      Breath sounds: Normal breath sounds.   Abdominal:      General: Abdomen is flat. Bowel sounds are normal. There is no distension.      Palpations: Abdomen is soft.      Tenderness: There is no abdominal tenderness.   Skin:     General: Skin is warm and dry.   Neurological:      Mental Status: He is alert. Mental status is at baseline.       Significant Labs: All pertinent labs within the past 24 hours have been reviewed.  BMP: No results for input(s): GLU, NA, K, CL, CO2, BUN, CREATININE, CALCIUM, MG in the last 48 hours.  CBC: No results for input(s): WBC, HGB, HCT, PLT in the last 48 hours.    Significant Imaging: I have reviewed all pertinent imaging results/findings within the past 24 hours.

## 2022-06-26 NOTE — PLAN OF CARE
Problem: Adult Inpatient Plan of Care  Goal: Plan of Care Review  Outcome: Ongoing, Progressing  Goal: Patient-Specific Goal (Individualized)  Outcome: Ongoing, Progressing  Goal: Optimal Comfort and Wellbeing  Outcome: Ongoing, Progressing     Problem: Diabetes Comorbidity  Goal: Blood Glucose Level Within Targeted Range  Outcome: Ongoing, Progressing     Problem: Infection Progression (Sepsis/Septic Shock)  Goal: Absence of Infection Signs and Symptoms  Outcome: Ongoing, Progressing     Problem: Nutrition Impaired (Sepsis/Septic Shock)  Goal: Optimal Nutrition Intake  Outcome: Ongoing, Progressing     Problem: Impaired Wound Healing  Goal: Optimal Wound Healing  Outcome: Ongoing, Progressing

## 2022-06-26 NOTE — ASSESSMENT & PLAN NOTE
- continue IV Vancomycin and Zosyn  - Pharmacy to dose Vanc      06/22/2022  -continue vancomycin and zosyn     6/23  -improving. Continue abx.     6/24 - near aspiration again. Will add some reglan to promote stomach emptying.     6/26 - Completed 6 days iv with clinical improvement. Have switched to po augmentin. D/c planning.

## 2022-06-27 VITALS
SYSTOLIC BLOOD PRESSURE: 138 MMHG | WEIGHT: 158.63 LBS | BODY MASS INDEX: 21.48 KG/M2 | RESPIRATION RATE: 20 BRPM | TEMPERATURE: 97 F | HEART RATE: 89 BPM | DIASTOLIC BLOOD PRESSURE: 70 MMHG | HEIGHT: 72 IN | OXYGEN SATURATION: 98 %

## 2022-06-27 LAB
GLUCOSE SERPL-MCNC: 133 MG/DL (ref 70–105)
GLUCOSE SERPL-MCNC: 137 MG/DL (ref 70–105)
GLUCOSE SERPL-MCNC: 140 MG/DL (ref 70–105)
SARS-COV+SARS-COV-2 AG RESP QL IA.RAPID: NEGATIVE
SARS-COV-2 RDRP RESP QL NAA+PROBE: NORMAL

## 2022-06-27 PROCEDURE — 94761 N-INVAS EAR/PLS OXIMETRY MLT: CPT

## 2022-06-27 PROCEDURE — 87426 SARSCOV CORONAVIRUS AG IA: CPT | Performed by: NURSE PRACTITIONER

## 2022-06-27 PROCEDURE — 25000003 PHARM REV CODE 250: Performed by: FAMILY MEDICINE

## 2022-06-27 PROCEDURE — 25000242 PHARM REV CODE 250 ALT 637 W/ HCPCS: Performed by: FAMILY MEDICINE

## 2022-06-27 PROCEDURE — 27000221 HC OXYGEN, UP TO 24 HOURS

## 2022-06-27 PROCEDURE — 87635 SARS-COV-2 COVID-19 AMP PRB: CPT | Performed by: NURSE PRACTITIONER

## 2022-06-27 PROCEDURE — 99232 SBSQ HOSP IP/OBS MODERATE 35: CPT | Mod: ,,, | Performed by: NURSE PRACTITIONER

## 2022-06-27 PROCEDURE — 30200315 PPD INTRADERMAL TEST REV CODE 302: Performed by: NURSE PRACTITIONER

## 2022-06-27 PROCEDURE — 99232 PR SUBSEQUENT HOSPITAL CARE,LEVL II: ICD-10-PCS | Mod: ,,, | Performed by: NURSE PRACTITIONER

## 2022-06-27 PROCEDURE — 94640 AIRWAY INHALATION TREATMENT: CPT

## 2022-06-27 PROCEDURE — 86580 TB INTRADERMAL TEST: CPT | Performed by: NURSE PRACTITIONER

## 2022-06-27 PROCEDURE — 99900035 HC TECH TIME PER 15 MIN (STAT)

## 2022-06-27 PROCEDURE — 82962 GLUCOSE BLOOD TEST: CPT

## 2022-06-27 PROCEDURE — 99239 HOSP IP/OBS DSCHRG MGMT >30: CPT | Mod: ,,, | Performed by: FAMILY MEDICINE

## 2022-06-27 PROCEDURE — 99239 PR HOSPITAL DISCHARGE DAY,>30 MIN: ICD-10-PCS | Mod: ,,, | Performed by: FAMILY MEDICINE

## 2022-06-27 RX ORDER — METOPROLOL TARTRATE 25 MG/1
12.5 TABLET, FILM COATED ORAL 2 TIMES DAILY
Qty: 30 TABLET | Refills: 11 | OUTPATIENT
Start: 2022-06-27 | End: 2023-06-27

## 2022-06-27 RX ORDER — AMOXICILLIN AND CLAVULANATE POTASSIUM 875; 125 MG/1; MG/1
1 TABLET, FILM COATED ORAL EVERY 12 HOURS
Qty: 8 TABLET | Refills: 0 | OUTPATIENT
Start: 2022-06-27 | End: 2022-07-01

## 2022-06-27 RX ORDER — AMLODIPINE BESYLATE 5 MG/1
5 TABLET ORAL DAILY
Qty: 30 TABLET | Refills: 0 | OUTPATIENT
Start: 2022-06-27 | End: 2022-07-27

## 2022-06-27 RX ADMIN — POTASSIUM BICARBONATE 40 MEQ: 391 TABLET, EFFERVESCENT ORAL at 08:06

## 2022-06-27 RX ADMIN — APIXABAN 5 MG: 5 TABLET, FILM COATED ORAL at 08:06

## 2022-06-27 RX ADMIN — ASPIRIN 81 MG 81 MG: 81 TABLET ORAL at 08:06

## 2022-06-27 RX ADMIN — METOPROLOL TARTRATE 12.5 MG: 25 TABLET, FILM COATED ORAL at 08:06

## 2022-06-27 RX ADMIN — SERTRALINE HYDROCHLORIDE 100 MG: 50 TABLET ORAL at 08:06

## 2022-06-27 RX ADMIN — IPRATROPIUM BROMIDE AND ALBUTEROL SULFATE 3 ML: 2.5; .5 SOLUTION RESPIRATORY (INHALATION) at 07:06

## 2022-06-27 RX ADMIN — TUBERCULIN PURIFIED PROTEIN DERIVATIVE 5 UNITS: 5 INJECTION, SOLUTION INTRADERMAL at 02:06

## 2022-06-27 RX ADMIN — ATORVASTATIN CALCIUM 80 MG: 80 TABLET, FILM COATED ORAL at 08:06

## 2022-06-27 RX ADMIN — IPRATROPIUM BROMIDE AND ALBUTEROL SULFATE 3 ML: 2.5; .5 SOLUTION RESPIRATORY (INHALATION) at 12:06

## 2022-06-27 RX ADMIN — AMLODIPINE BESYLATE 5 MG: 5 TABLET ORAL at 08:06

## 2022-06-27 RX ADMIN — METOCLOPRAMIDE 5 MG: 5 TABLET ORAL at 08:06

## 2022-06-27 RX ADMIN — METFORMIN HYDROCHLORIDE 500 MG: 500 TABLET, FILM COATED ORAL at 05:06

## 2022-06-27 RX ADMIN — THERA TABS 1 TABLET: TAB at 08:06

## 2022-06-27 RX ADMIN — METFORMIN HYDROCHLORIDE 500 MG: 500 TABLET, FILM COATED ORAL at 07:06

## 2022-06-27 RX ADMIN — PHENYTOIN 200 MG: 125 SUSPENSION ORAL at 08:06

## 2022-06-27 RX ADMIN — LEVETIRACETAM 1000 MG: 100 SOLUTION ORAL at 08:06

## 2022-06-27 RX ADMIN — POLYETHYLENE GLYCOL 3350 17 G: 17 POWDER, FOR SOLUTION ORAL at 08:06

## 2022-06-27 RX ADMIN — AMOXICILLIN AND CLAVULANATE POTASSIUM 1 TABLET: 875; 125 TABLET, FILM COATED ORAL at 08:06

## 2022-06-27 NOTE — PLAN OF CARE
[facility]  Discharge Final Note    Primary Care Provider: Primary Doctor No    Expected Discharge Date: 6/27/2022    Final Discharge Note (most recent)     Final Note - 06/27/22 1326        Final Note    Assessment Type Final Discharge Note     Anticipated Discharge Disposition Skilled Nursing Facility        Post-Acute Status    Post-Acute Authorization Placement     Post-Acute Placement Status Set-up Complete/Auth obtained     Patient choice form signed by patient/caregiver List with quality metrics by geographic area provided;List from CMS Compare;List from System Post-Acute Care     Discharge Delays None known at this time                 Important Message from Medicare             Contact Info     No, Primary Doctor   Relationship: PCP - General        Next Steps: Follow up in 1 week(s)        1325  Ok from [facility] pt may return to [facility] this day. Packet delivered to [facility]. Family, Geetha made aware and IM was updated. 0 further dc needs at this time.

## 2022-06-27 NOTE — PROGRESS NOTES
Patient discharged to [facility] and taken off unit via stretcher. Patient has PEG tube and dressing to sacrum. Patient is stable. NAD noted. Taken off unit by [facility], [place], operated by [facility] ambulance staff.

## 2022-06-27 NOTE — PLAN OF CARE
Problem: Fall Injury Risk  Goal: Absence of Fall and Fall-Related Injury  Outcome: Ongoing, Progressing  Intervention: Identify and Manage Contributors  Flowsheets (Taken 6/27/2022 0319)  Medication Review/Management: medications reviewed     Problem: Skin Injury Risk Increased  Goal: Skin Health and Integrity  Outcome: Ongoing, Progressing     Problem: Fall Injury Risk  Goal: Absence of Fall and Fall-Related Injury  Outcome: Ongoing, Progressing  Intervention: Identify and Manage Contributors  Flowsheets (Taken 6/27/2022 0319)  Medication Review/Management: medications reviewed     Problem: Fall Injury Risk  Goal: Absence of Fall and Fall-Related Injury  Intervention: Identify and Manage Contributors  Flowsheets (Taken 6/27/2022 0319)  Medication Review/Management: medications reviewed     Problem: Skin Injury Risk Increased  Goal: Skin Health and Integrity  Outcome: Ongoing, Progressing

## 2022-06-27 NOTE — PLAN OF CARE
Problem: Adult Inpatient Plan of Care  Goal: Absence of Hospital-Acquired Illness or Injury  Outcome: Met     Problem: Diabetes Comorbidity  Goal: Blood Glucose Level Within Targeted Range  Outcome: Ongoing, Progressing     Problem: Fall Injury Risk  Goal: Absence of Fall and Fall-Related Injury  Outcome: Met     Problem: Skin Injury Risk Increased  Goal: Skin Health and Integrity  Outcome: Met

## 2022-06-27 NOTE — PROGRESS NOTES
[facility]  Wound Care  Progress Note    Patient Name: Mahesh Acuña  MRN: [id number]  Admission Date: 6/21/2022  Attending Physician: Zion Dallas MD    Past Medical History:   Diagnosis Date    Coronary artery disease     Dementia     Depression     Diabetes mellitus     GERD (gastroesophageal reflux disease)     PVD (peripheral vascular disease)     Seizures     Stroke         Subjective:     HPI:  Mahesh Acuña is a 67 year old male with wounds to left BKA, sacral, and DTPI to right foot and right heel. Wounds are improving, patient is d/c'ing today. He is a resident at [facility]. He was admitted on 6/19 to [facility] due to aspiration pneumonia, sepsis, and DVT of BLE. Significant PMH CVA, left BKA, PVD, diabetes, DVTs, CAD, and dysphagia. He is non-verbal and mobility is severely impaired. He is high risk for skin breakdown due to hypoperfusion and immobility.        Review of Systems   Unable to perform ROS: Patient nonverbal     Objective:     Vital Signs (Most Recent):  Temp: 98.6 °F (37 °C) (06/27/22 0800)  Pulse: 97 (06/27/22 0800)  Resp: 20 (06/27/22 0800)  BP: 132/61 (06/27/22 0834)  SpO2: 97 % (06/27/22 0800) Vital Signs (24h Range):  Temp:  [97 °F (36.1 °C)-98.6 °F (37 °C)] 98.6 °F (37 °C)  Pulse:  [] 97  Resp:  [14-20] 20  SpO2:  [93 %-100 %] 97 %  BP: (115-151)/(61-93) 132/61     Weight: 71.9 kg (158 lb 9.6 oz)  Body mass index is 21.51 kg/m².  No data recorded    Physical Exam  Vitals reviewed.   Constitutional:       Appearance: He is ill-appearing.   HENT:      Head: Normocephalic.   Cardiovascular:      Rate and Rhythm: Tachycardia present. Rhythm irregular.   Pulmonary:      Effort: Pulmonary effort is normal.   Musculoskeletal:         General: Swelling and deformity present.      Right lower leg: Edema present.      Left lower leg: Edema present.   Skin:     Findings: Erythema present.      Comments: Wound, see LDA for photos and measurements    Neurological:      Mental Status: Mental status is at baseline.      Motor: Weakness present.      Gait: Gait abnormal.               Altered Skin Integrity 06/22/22 1130 Sacral spine Other (comment) (Active)   06/22/22 1130   Altered Skin Integrity Present on Admission: yes   Side:    Orientation:    Location: Sacral spine   Wound Number:    Is this injury device related?:    Primary Wound Type: Other   Description of Altered Skin Integrity:    Removal Indication and Assessment:    Wound Outcome:    (Retired) Wound Length (cm):    (Retired) Wound Width (cm):    (Retired) Depth (cm):    Wound Description (Comments):    Removal Indications:    Wound Image   06/23/22 0911   Dressing Appearance Dried drainage 06/24/22 1130   Appearance Dressing in place, unable to visualize 06/27/22 0830   Periwound Area Dry 06/24/22 1130   Wound Edges Callused 06/24/22 1130   Care Cleansed with:;Wound cleanser 06/26/22 0954   Dressing Removed;Applied;Changed 06/26/22 0954   Dressing Change Due 06/25/22 06/24/22 1130   Number of days: 5            Altered Skin Integrity 06/22/22 1130 Right Heel Purple or maroon localized area of discolored intact skin or non-intact skin or a blood-filled blister. (Active)   06/22/22 1130   Altered Skin Integrity Present on Admission:    Side: Right   Orientation:    Location: Heel   Wound Number:    Is this injury device related?:    Primary Wound Type:    Description of Altered Skin Integrity: Purple or maroon localized area of discolored intact skin or non-intact skin or a blood-filled blister.   Removal Indication and Assessment:    Wound Outcome:    (Retired) Wound Length (cm):    (Retired) Wound Width (cm):    (Retired) Depth (cm):    Wound Description (Comments):    Removal Indications:    Wound Image   06/23/22 0914   Description of Altered Skin Integrity Purple or maroon localized area of discolored intact skin or non-intact skin or a blood-filled blister. 06/23/22 0914   Appearance Dressing  in place, unable to visualize 06/27/22 0830   Care Cleansed with:;Soap and water 06/23/22 0914   Dressing Applied;Foam 06/23/22 0914   Number of days: 5            Altered Skin Integrity 06/22/22 1130 Left lateral Foot Intact skin with non-blanchable redness of localized area (Active)   06/22/22 1130   Altered Skin Integrity Present on Admission:    Side: Left   Orientation: lateral   Location: Foot   Wound Number:    Is this injury device related?:    Primary Wound Type:    Description of Altered Skin Integrity: Intact skin with non-blanchable redness of localized area   Removal Indication and Assessment:    Wound Outcome:    (Retired) Wound Length (cm):    (Retired) Wound Width (cm):    (Retired) Depth (cm):    Wound Description (Comments):    Removal Indications:    Wound Image   06/23/22 0915   Dressing Appearance Open to air 06/27/22 0830   Appearance Red 06/27/22 0830   Care Cleansed with:;Soap and water 06/23/22 0915   Number of days: 5            Wound 06/19/22 2136 Other (comment) Left anterior;lower;proximal Leg (Active)   06/19/22 2136    Pre-existing: Yes   Primary Wound Type: Other   Side: Left   Orientation: anterior;lower;proximal   Location: Leg   Wound Number:    Ankle-Brachial Index:    Pulses:    Removal Indication and Assessment:    Wound Outcome:    (Retired) Wound Type:    (Retired) Wound Length (cm):    (Retired) Wound Width (cm):    (Retired) Depth (cm):    Wound Description (Comments):    Removal Indications:    Wound Image   06/22/22 1130   Dressing Appearance Moist drainage 06/24/22 1130   Drainage Amount Small 06/24/22 1130   Drainage Characteristics/Odor Serosanguineous;No odor 06/24/22 1130   Appearance Dressing in place, unable to visualize 06/27/22 0830   Tissue loss description Full thickness 06/24/22 1130   Red (%), Wound Tissue Color 70 % 06/22/22 1130   Yellow (%), Wound Tissue Color 30 % 06/22/22 1130   Periwound Area Moist 06/24/22 1130   Wound Edges Defined 06/24/22 1130    Wound Length (cm) 5.5 cm 06/22/22 1130   Wound Width (cm) 7.5 cm 06/22/22 1130   Wound Depth (cm) 0.4 cm 06/22/22 1130   Wound Volume (cm^3) 16.5 cm^3 06/22/22 1130   Wound Surface Area (cm^2) 41.25 cm^2 06/22/22 1130   Care Cleansed with:;Wound cleanser 06/26/22 0954   Dressing Applied;Changed 06/26/22 0954   Packing other (see comment) 06/21/22 1200   Periwound Care Moisture barrier applied 06/24/22 1130   Dressing Change Due 06/25/22 06/24/22 1130   Number of days: 8         Assessment and Plan      Pressure injury of deep tissue of right heel  Clean with vashe  Protect with foam border  Change M, W, F and PRN  Assess daily     Pressure injury of deep tissue of right foot  Clean with vashe  Protect with foam border  Change M, W, F and PRN  Assess daily     Pressure injury of left leg, stage 3  Clean with vashe  Apply sensicare periwound  Apply urea   Cover with 4x4s, cheryle, and paper tape  Change daily  Low air loss mattress  TAP system  Turn every 2 hours     Pressure injury of deep tissue of sacral region  Clean with vashe  Apply urea   Cover with 4x4s and paper tape  Change daily  Low air loss mattress  TAP system  Turn every 2 hours  Discharge wound care orders complete.       Signature:  MERLY Calderón  Wound Care    Date of encounter: 06/27/2022

## 2022-06-27 NOTE — PLAN OF CARE
Phoned [facility] [phone], left message for [facility] to see if pt ok to dc to them this day. Awaiting call back. Will follow.    1042 phoned [facility] again now to follow up with pt possibly dc to them today. Packet started.    1238 left ANOTHER message for [facility] or [facility], please let me know if pt can return this day.

## 2022-06-27 NOTE — DISCHARGE SUMMARY
[facility] Medicine  Discharge Summary      Patient Name: Mahesh Acuña  MRN: [id number]  Patient Class: IP- Inpatient  Admission Date: 6/21/2022  Hospital Length of Stay: 6 days  Discharge Date and Time:  06/27/2022 1:25 PM  Attending Physician: Zion Dallas MD   Discharging Provider: MERLY Rivera  Primary Care Provider: Primary Doctor No      HPI:   Patient is a 66 yo male from [facility] that has a pmhx of CVA (nonverbal), left BKA, PVD, dysphagia with PEG tube that presented to [facility] with shortness of breath. He was admitted and treated for sepsis 2/2 aspiration pneumonia, elevated troponins, and bilateral LE DVTs. He is on IV Vancomycin and Zosyn that will need to be continued for a total of 10 days. He was on a heparin drip up until today, but has now been transitioned to Eliquis in order to treat DVTs. He will be discharged from [facility] and admitted to [facility] for continued antibiotics.      * No surgery found *      Hospital Course:   06/22/2022 Resting in bed. NRB  on at 15 liters.Chest is clear. Heart RRR. NA is 149. Potassium is 3.3. Continue abx for aspiration pneumonia. Blood culture shows gram positive cocci.   06/27/2022 Awake and resting in bed. Nonverbal. Has been treated for aspiration pneumonia and will be able to discharge soon. Will talk with  re discharge today if possible , if not then tomorrow.  06/27 Has been accepted back to [facility]. Medication reconciliation done. Continue PEG feedings with aspiration precautions. Blood glucose checks every 6 hours and prn. Continue augmentin for another 4 days. Continue wound care per [facility] wound care orders. Follow up with PCP in one week.       Goals of Care Treatment Preferences:  Code Status: Full Code      Consults:   Consults (From admission, onward)        Status Ordering Provider     Inpatient consult to Registered  Dietitian/Nutritionist  Once        Provider:  (Not yet assigned)    Completed LENNY GUO L          * Aspiration pneumonia  - continue IV Vancomycin and Zosyn  - Pharmacy to dose Vanc      06/22/2022  -continue vancomycin and zosyn     6/23  -improving. Continue abx.     6/24 - near aspiration again. Will add some reglan to promote stomach emptying.     6/26 - Completed 6 days iv with clinical improvement. Have switched to po augmentin. D/c planning.     06/27 dc today. Continue augmentin for 4 days. aspiration precautions       Pressure injury of left leg, stage 3    06/27 continue wound care, make follow up appt with [facility]     Pressure injury of deep tissue of sacral region  06/27 continue wound care. Make follow up appt with [facility] wound care team      DM2 (diabetes mellitus, type 2)  - continue prior diabetes management    -06/22/2022  -accuchecks every 6 hours    6/23 - glucoses a little low. Will d/c lantus and see how he does with metformin alone.     6/25 - glucoses ok.     06/27 continue metformin      Final Active Diagnoses:    Diagnosis Date Noted POA    PRINCIPAL PROBLEM:  Aspiration pneumonia [J69.0] 06/19/2022 Yes    Pressure injury of deep tissue of sacral region [L89.156] 06/22/2022 Yes    Pressure injury of left leg, stage 3 [L89.893] 06/22/2022 Yes    Pressure injury of deep tissue of right foot [L89.896] 06/22/2022 Yes    Pressure injury of deep tissue of right heel [L89.616] 06/22/2022 Yes    Deep vein thrombosis (DVT) of both lower extremities [I82.403] 06/20/2022 Yes    Sepsis [A41.9] 06/19/2022 Yes    Elevated troponin [R77.8] 06/19/2022 Yes    DM2 (diabetes mellitus, type 2) [E11.9] 06/19/2022 Yes      Problems Resolved During this Admission:       Discharged Condition: stable    Disposition: Skilled Nursing Facility    Follow Up:   Follow-up Information     Primary Doctor No Follow up in 1 week(s).                     Patient Instructions:      Tube  Feedings/Formulas   Order Comments: Glucerna 1.5 at 50 mls every hour, free water flush 60 mls every 2 hours     Order Specific Question Answer Comments   Select Adult Formula: Other glucerna 1.5   Route: Gastrostomy    Formula Rate (mL/hr): 50        Significant Diagnostic Studies: Labs: All labs within the past 24 hours have been reviewed    Pending Diagnostic Studies:     Procedure Component Value Units Date/Time    COVID-19 Rapid Screening [[id number]] Collected: 06/27/22 1011    Order Status: Sent Lab Status: In process Updated: 06/27/22 1041    Specimen: Nasal Swab          Medications:  Reconciled Home Medications:      Medication List      START taking these medications    amoxicillin-clavulanate 875-125mg 875-125 mg per tablet  Commonly known as: AUGMENTIN  1 tablet by Per G Tube route every 12 (twelve) hours. for 4 days     metoprolol tartrate 25 MG tablet  Commonly known as: LOPRESSOR  0.5 tablets (12.5 mg total) by Per G Tube route 2 (two) times daily.     multivitamin Tab  Take 1 tablet by mouth once daily.  Start taking on: June 28, 2022        CONTINUE taking these medications    acetaminophen 650 MG Tbsr  Commonly known as: TYLENOL  650 mg by Per G Tube route every 4 (four) hours as needed (fever >/= 101.4 or pain).     albuterol 2.5 mg /3 mL (0.083 %) nebulizer solution  Commonly known as: PROVENTIL  Take 2.5 mg by nebulization every 2 (two) hours as needed.     albuterol-ipratropium 2.5 mg-0.5 mg/3 mL nebulizer solution  Commonly known as: DUO-NEB  Take 3 mLs by nebulization every 6 (six) hours.     amLODIPine 5 MG tablet  Commonly known as: NORVASC  Take 1 tablet (5 mg total) by mouth once daily.     aspirin 81 MG Chew  Take 81 mg by mouth once daily. Give per peg     atorvastatin 80 MG tablet  Commonly known as: LIPITOR  80 mg by Per G Tube route every evening.     donepeziL 10 MG tablet  Commonly known as: ARICEPT  10 mg by Per G Tube route every evening.     ELIQUIS 5 mg Tab  Generic drug:  apixaban  5 mg by Per G Tube route 2 (two) times a day.     hydrocortisone 25 mg suppository  Commonly known as: ANUSOL-HC  Place 25 mg rectally every 8 (eight) hours as needed for Hemorrhoids.     levetiracetam 500 mg/5 mL (5 mL) Soln  1,000 mg by Per G Tube route 2 (two) times daily.     metFORMIN 500 MG tablet  Commonly known as: GLUCOPHAGE  500 mg by Per G Tube route 2 (two) times daily with meals.     multivitamin per tablet  Commonly known as: THERAGRAN  1 tablet by Per G Tube route once daily.     omeprazole 20 MG capsule  Commonly known as: PRILOSEC  Take 20 mg by mouth once daily. Give per peg     phenytoin 125 mg/5 mL suspension  Commonly known as: DILANTIN  8 mLs by Per G Tube route 2 (two) times a day.     polyethylene glycol 17 gram Pwpk  Commonly known as: GLYCOLAX  17 g by Per G Tube route every Mon, Wed, Fri.     sertraline 100 MG tablet  Commonly known as: ZOLOFT  100 mg by Per G Tube route once daily.        STOP taking these medications    insulin glargine 100 unit/mL injection  Commonly known as: Lantus     predniSONE 20 MG tablet  Commonly known as: DELTASONE     vancomycin 125 MG capsule  Commonly known as: VANCOCIN            Indwelling Lines/Drains at time of discharge:   Lines/Drains/Airways     Drain  Duration                Gastrostomy/Enterostomy 06/23/22 0000 midline 4 days                Time spent on the discharge of patient: 35 minutes         MERLY Rivera  Department of Hospital Medicine  [facility]

## 2022-06-27 NOTE — PROGRESS NOTES
[facility] [place] Medicine  Progress Note    Patient Name: Mahesh Acuña  MRN: [id number]  Patient Class: IP- Inpatient   Admission Date: 6/21/2022  Length of Stay: 6 days  Attending Physician: Zion Dallas MD  Primary Care Provider: Primary Doctor No        Subjective:     Principal Problem:Aspiration pneumonia        HPI:  Patient is a 66 yo male from [facility] that has a pmhx of CVA (nonverbal), left BKA, PVD, dysphagia with PEG tube that presented to [facility] with shortness of breath. He was admitted and treated for sepsis 2/2 aspiration pneumonia, elevated troponins, and bilateral LE DVTs. He is on IV Vancomycin and Zosyn that will need to be continued for a total of 10 days. He was on a heparin drip up until today, but has now been transitioned to Eliquis in order to treat DVTs. He will be discharged from [facility] and admitted to [facility] for continued antibiotics.      Overview/Hospital Course:  06/22/2022 Resting in bed. NRB  on at 15 liters.Chest is clear. Heart RRR. NA is 149. Potassium is 3.3. Continue abx for aspiration pneumonia. Blood culture shows gram positive cocci.   06/27/2022 Awake and resting in bed. Nonverbal. Has been treated for aspiration pneumonia and will be able to discharge soon. Will talk with  re discharge today if possible , if not then tomorrow.      Interval History: 06/27/2022 Awake and resting in bed. Nonverbal. Has been treated for aspiration pneumonia and will be able to discharge soon. Will talk with  re discharge today if possible , if not then tomorrow.    Review of Systems   Unable to perform ROS: Patient nonverbal   Objective:     Vital Signs (Most Recent):  Temp: 98.6 °F (37 °C) (06/27/22 0800)  Pulse: 97 (06/27/22 0800)  Resp: 20 (06/27/22 0800)  BP: 132/61 (06/27/22 0834)  SpO2: 97 % (06/27/22 0800) Vital Signs (24h Range):  Temp:  [97 °F (36.1 °C)-98.6  °F (37 °C)] 98.6 °F (37 °C)  Pulse:  [] 97  Resp:  [14-20] 20  SpO2:  [93 %-100 %] 97 %  BP: (115-151)/(61-93) 132/61     Weight: 71.9 kg (158 lb 9.6 oz)  Body mass index is 21.51 kg/m².  No intake or output data in the 24 hours ending 06/27/22 1115   Physical Exam  HENT:      Mouth/Throat:      Mouth: Mucous membranes are moist.   Cardiovascular:      Rate and Rhythm: Normal rate and regular rhythm.      Heart sounds: Normal heart sounds.   Pulmonary:      Breath sounds: Rhonchi present.   Abdominal:      General: Bowel sounds are normal.      Palpations: Abdomen is soft.      Comments: Peg tube        Significant Labs: All pertinent labs within the past 24 hours have been reviewed.  Recent Lab Results  (Last 5 results in the past 24 hours)        06/27/22  0437   06/26/22  2352   06/26/22  1723   06/26/22  1515   06/26/22  1223        POC Glucose 140   137   152     131       Vancomycin-Trough       9.8                                Significant Imaging: I have reviewed all pertinent imaging results/findings within the past 24 hours.      Assessment/Plan:      * Aspiration pneumonia  - continue IV Vancomycin and Zosyn  - Pharmacy to dose Vanc      06/22/2022  -continue vancomycin and zosyn     6/23  -improving. Continue abx.     6/24 - near aspiration again. Will add some reglan to promote stomach emptying.     6/26 - Completed 6 days iv with clinical improvement. Have switched to po augmentin. D/c planning.       Deep vein thrombosis (DVT) of both lower extremities  - Heparin changed to Eliquis today      06/22/2022  -continue eliquiis BID       Elevated troponin  - cardiology consulted, recommend continuing medical management and patient is a poor candidate for invasive procedures      Hx of stroke associated with blood clotting tendency  - continue Eliquis      DM2 (diabetes mellitus, type 2)  - continue prior diabetes management    -06/22/2022  -accuchecks every 6 hours    6/23 - glucoses a little low.  Will d/c lantus and see how he does with metformin alone.     6/25 - glucoses ok.     Sepsis  -resolved      VTE Risk Mitigation (From admission, onward)         Ordered     apixaban tablet 5 mg  2 times daily         06/22/22 0813     IP VTE LOW RISK PATIENT  Once         06/21/22 1855     Place sequential compression device  Until discontinued         06/21/22 1855                Discharge Planning   BYRON:      Code Status: Full Code   Is the patient medically ready for discharge?:     Reason for patient still in hospital (select all that apply): Treatment  Discharge Plan A: Return to nursing home                  MERLY Rivera  Department of Hospital Medicine   [facility]

## 2022-06-27 NOTE — SUBJECTIVE & OBJECTIVE
Interval History: 06/27/2022 Awake and resting in bed. Nonverbal. Has been treated for aspiration pneumonia and will be able to discharge soon. Will talk with  re discharge today if possible , if not then tomorrow.    Review of Systems   Unable to perform ROS: Patient nonverbal   Objective:     Vital Signs (Most Recent):  Temp: 98.6 °F (37 °C) (06/27/22 0800)  Pulse: 97 (06/27/22 0800)  Resp: 20 (06/27/22 0800)  BP: 132/61 (06/27/22 0834)  SpO2: 97 % (06/27/22 0800) Vital Signs (24h Range):  Temp:  [97 °F (36.1 °C)-98.6 °F (37 °C)] 98.6 °F (37 °C)  Pulse:  [] 97  Resp:  [14-20] 20  SpO2:  [93 %-100 %] 97 %  BP: (115-151)/(61-93) 132/61     Weight: 71.9 kg (158 lb 9.6 oz)  Body mass index is 21.51 kg/m².  No intake or output data in the 24 hours ending 06/27/22 1115   Physical Exam  HENT:      Mouth/Throat:      Mouth: Mucous membranes are moist.   Cardiovascular:      Rate and Rhythm: Normal rate and regular rhythm.      Heart sounds: Normal heart sounds.   Pulmonary:      Breath sounds: Rhonchi present.   Abdominal:      General: Bowel sounds are normal.      Palpations: Abdomen is soft.      Comments: Peg tube        Significant Labs: All pertinent labs within the past 24 hours have been reviewed.  Recent Lab Results  (Last 5 results in the past 24 hours)        06/27/22  0437   06/26/22  2352   06/26/22  1723   06/26/22  1515   06/26/22  1223        POC Glucose 140   137   152     131       Vancomycin-Trough       9.8                                Significant Imaging: I have reviewed all pertinent imaging results/findings within the past 24 hours.

## 2022-06-27 NOTE — ASSESSMENT & PLAN NOTE
- continue IV Vancomycin and Zosyn  - Pharmacy to dose Vanc      06/22/2022  -continue vancomycin and zosyn     6/23  -improving. Continue abx.     6/24 - near aspiration again. Will add some reglan to promote stomach emptying.     6/26 - Completed 6 days iv with clinical improvement. Have switched to po augmentin. D/c planning.     06/27 dc today. Continue augmentin for 4 days. aspiration precautions

## 2022-06-27 NOTE — ASSESSMENT & PLAN NOTE
- continue prior diabetes management    -06/22/2022  -accuchecks every 6 hours    6/23 - glucoses a little low. Will d/c lantus and see how he does with metformin alone.     6/25 - glucoses ok.     06/27 continue metformin

## 2022-08-14 NOTE — DISCHARGE SUMMARY
[facility] - [address] [place] [address] Medicine  Discharge Summary      Patient Name: Mahesh Acuña  MRN: [id number]  Patient Class: IP- Inpatient  Admission Date: 6/19/2022  Hospital Length of Stay: 2 days  Discharge Date and Time: 6/21/2022  5:27 PM  Attending Physician: No att. providers found   Discharging Provider: Umang De Leon Jr, MD  Primary Care Provider: Primary Doctor No      HPI:   67 year old with PMHx of CVA, Left BKA, encephalopathy, dysphagia HTN, came in from [place] due to SOB. Patient is non verbal and has encephalopathy therefore Hx was obtained from [facility] personal via a phone conversation and limited Hx available in his chart. He was found having SOB, gastric content around.in his mouth and abnormal vital signs therefore was brought to the [facility] via EMS. Patient has PEG tube and Upon suctioning his mouth in the ED, PEG feeding content was noticed.     Patient has had a CVA that has left him with encephalopathy. He presented with AMS therefore there was concern regarding a possible stroke but [facility] reports that this is his normal state. Patient had a Aspiration pneumonia back in December 2021 which was treated in [facility] then patient also had a PEG. Patient has DM2 and takes Lantus 10 units nightly. He has hemrrhoids. Per chart he has peripheral vascular disease and takes atorvastatin. He has Hx of seizure and per charts e takes both Keppra and Phenytoin. Other PMHx includes GERD and MDD.     ED course:  Vitals: Patient meets septic criteria with pulse of 132, RR 42 bp 112/65 temp 97.9 and a known source of infection  Chest xray: Patchy right basilar densities are nonspecific and may represent infectious/inflammatory change versus atelectasis.  CBC: WBC 14.19 Hgb of 10.1  BMP: sodium of 153, glucose of 306, alk phosph 222   Lactic acid was 3.4- second take is pending  Troponin was 78.8 and trending  Probnp 267      * No surgery found *      Hospital Course:   6/20-  Admitted yesterday from the [facility] with altered mental status.  He is started on empiric antibiotics.  Found to have clots in both legs. On heparin drip. Patient resting in bed with stable vital signs. PTT was elevated at 183.9 so heparin was stopped. Repeat lab is pending. Patient's troponin has trended downward to 92.2 from 103.3. US doppler of LE showed bilateral DVTs. Patient's Bcx are pending, will keep on empiric IV antibiotics for aspiration pneumonia for now.  He is being transferred to [facility] for further care. He will be on my service.        Goals of Care Treatment Preferences:  Code Status: Full Code      Consults:   Consults (From admission, onward)        Status Ordering Provider     IP consult to case management  Once        Provider:  (Not yet assigned)    Completed ILDEFONSO VAZQUEZ     Inpatient consult to Cardiology  Once        Provider:  (Not yet assigned)    Completed MIKE MENDIOLA          No new Assessment & Plan notes have been filed under this hospital service since the last note was generated.  Service: Hospital Medicine    Final Active Diagnoses:    Diagnosis Date Noted POA    PRINCIPAL PROBLEM:  Aspiration pneumonia [J69.0] 06/19/2022 Yes    Chest pain [R07.9]  Yes    Deep vein thrombosis (DVT) of both lower extremities [I82.403] 06/20/2022 Unknown    Sepsis [A41.9] 06/19/2022 Unknown    DM2 (diabetes mellitus, type 2) [E11.9] 06/19/2022 Yes    Hx of stroke associated with blood clotting tendency [Z86.73] 06/19/2022 Not Applicable    GERD (gastroesophageal reflux disease) [K21.9] 06/19/2022 Yes    Hx of Clostridium difficile infection [Z86.19] 06/19/2022 Yes    Elevated troponin [R77.8] 06/19/2022 Yes      Problems Resolved During this Admission:       Discharged Condition: fair    Disposition: Long Term Care    Follow Up:    Patient Instructions:   No discharge procedures on file.    Significant Diagnostic Studies: Labs: All labs within the past 24  hours have been reviewed    Pending Diagnostic Studies:     Procedure Component Value Units Date/Time    EXTRA TUBES [[id number]] Collected: 06/20/22 1548    Order Status: Sent Lab Status: In process Updated: 06/20/22 1548    Specimen: Blood, Venous     Narrative:      The following orders were created for panel order EXTRA TUBES.  Procedure                               Abnormality         Status                     ---------                               -----------         ------                     Lavender Top Hold[[id number]]                                In process                 Gold Top Hold[[id number]]                                    In process                   Please view results for these tests on the individual orders.    EXTRA TUBES [[id number]] Collected: 06/19/22 1859    Order Status: Sent Lab Status: In process Updated: 06/19/22 1958    Specimen: Blood, Venous     Narrative:      The following orders were created for panel order EXTRA TUBES.  Procedure                               Abnormality         Status                     ---------                               -----------         ------                     Gold Top Hold[[id number]]                                    In process                   Please view results for these tests on the individual orders.         Medications:  Transfer Medications (for Discharge Readmit only):   No current facility-administered medications for this encounter.     Current Outpatient Medications   Medication Sig Dispense Refill    albuterol (PROVENTIL) 2.5 mg /3 mL (0.083 %) nebulizer solution Take 2.5 mg by nebulization every 2 (two) hours as needed.      albuterol-ipratropium (DUO-NEB) 2.5 mg-0.5 mg/3 mL nebulizer solution Take 3 mLs by nebulization every 6 (six) hours.      aspirin 81 MG Chew Take 81 mg by mouth once daily. Give per peg      atorvastatin (LIPITOR) 80 MG tablet 80 mg by Per G Tube route every evening.      donepeziL (ARICEPT) 10 MG  tablet 10 mg by Per G Tube route every evening.      ELIQUIS 5 mg Tab 5 mg by Per G Tube route 2 (two) times a day.      levetiracetam 500 mg/5 mL (5 mL) Soln 1,000 mg by Per G Tube route 2 (two) times daily.      metFORMIN (GLUCOPHAGE) 500 MG tablet 500 mg by Per G Tube route 2 (two) times daily with meals.      multivitamin (THERAGRAN) per tablet 1 tablet by Per G Tube route once daily.      omeprazole (PRILOSEC) 20 MG capsule Take 20 mg by mouth once daily. Give per peg      phenytoin (DILANTIN) 125 mg/5 mL suspension 8 mLs by Per G Tube route 2 (two) times a day.      polyethylene glycol (GLYCOLAX) 17 gram PwPk 17 g by Per G Tube route every Mon, Wed, Fri.      sertraline (ZOLOFT) 100 MG tablet 100 mg by Per G Tube route once daily.      acetaminophen (TYLENOL) 650 MG TbSR 650 mg by Per G Tube route every 4 (four) hours as needed (fever >/= 101.4 or pain).      amLODIPine (NORVASC) 5 MG tablet Take 1 tablet (5 mg total) by mouth once daily. 30 tablet 0    hydrocortisone (ANUSOL-HC) 25 mg suppository Place 25 mg rectally every 8 (eight) hours as needed for Hemorrhoids.      metoprolol tartrate (LOPRESSOR) 25 MG tablet 0.5 tablets (12.5 mg total) by Per G Tube route 2 (two) times daily. 30 tablet 11       Indwelling Lines/Drains at time of discharge:   Lines/Drains/Airways     None                 Time spent on the discharge of patient: 25 minutes         Umang De Leon Jr, MD  Department of Hospital Medicine  [facility] [address] [facility]